# Patient Record
Sex: MALE | Race: WHITE | NOT HISPANIC OR LATINO | URBAN - METROPOLITAN AREA
[De-identification: names, ages, dates, MRNs, and addresses within clinical notes are randomized per-mention and may not be internally consistent; named-entity substitution may affect disease eponyms.]

---

## 2020-10-05 ENCOUNTER — OFFICE VISIT (OUTPATIENT)
Dept: FAMILY MEDICINE CLINIC | Facility: HOSPITAL | Age: 45
End: 2020-10-05
Payer: COMMERCIAL

## 2020-10-05 VITALS
OXYGEN SATURATION: 97 % | BODY MASS INDEX: 44.52 KG/M2 | SYSTOLIC BLOOD PRESSURE: 144 MMHG | WEIGHT: 311 LBS | DIASTOLIC BLOOD PRESSURE: 90 MMHG | TEMPERATURE: 97.2 F | HEIGHT: 70 IN | HEART RATE: 86 BPM

## 2020-10-05 DIAGNOSIS — E66.01 CLASS 3 SEVERE OBESITY DUE TO EXCESS CALORIES WITHOUT SERIOUS COMORBIDITY WITH BODY MASS INDEX (BMI) OF 40.0 TO 44.9 IN ADULT (HCC): ICD-10-CM

## 2020-10-05 DIAGNOSIS — Z13.220 SCREENING CHOLESTEROL LEVEL: ICD-10-CM

## 2020-10-05 DIAGNOSIS — R06.81 WITNESSED APNEIC SPELLS: ICD-10-CM

## 2020-10-05 DIAGNOSIS — R03.0 ELEVATED BLOOD-PRESSURE READING WITHOUT DIAGNOSIS OF HYPERTENSION: ICD-10-CM

## 2020-10-05 DIAGNOSIS — R52 MIGRATORY PAIN: ICD-10-CM

## 2020-10-05 DIAGNOSIS — R53.83 OTHER FATIGUE: ICD-10-CM

## 2020-10-05 DIAGNOSIS — Z00.00 ANNUAL PHYSICAL EXAM: Primary | ICD-10-CM

## 2020-10-05 DIAGNOSIS — Z23 ENCOUNTER FOR IMMUNIZATION: ICD-10-CM

## 2020-10-05 DIAGNOSIS — Z13.1 SCREENING FOR DIABETES MELLITUS: ICD-10-CM

## 2020-10-05 PROCEDURE — 1036F TOBACCO NON-USER: CPT | Performed by: NURSE PRACTITIONER

## 2020-10-05 PROCEDURE — 3725F SCREEN DEPRESSION PERFORMED: CPT | Performed by: NURSE PRACTITIONER

## 2020-10-05 PROCEDURE — 99386 PREV VISIT NEW AGE 40-64: CPT | Performed by: NURSE PRACTITIONER

## 2020-10-05 PROCEDURE — 90471 IMMUNIZATION ADMIN: CPT

## 2020-10-05 PROCEDURE — 90715 TDAP VACCINE 7 YRS/> IM: CPT

## 2020-10-26 DIAGNOSIS — E78.1 HYPERTRIGLYCERIDEMIA: Primary | ICD-10-CM

## 2020-10-26 LAB
1,25(OH)2D3 SERPL-MCNC: 50.2 PG/ML (ref 19.9–79.3)
ALBUMIN SERPL-MCNC: 4.8 G/DL (ref 4–5)
ALBUMIN/GLOB SERPL: 2 {RATIO} (ref 1.2–2.2)
ALP SERPL-CCNC: 51 IU/L (ref 39–117)
ALT SERPL-CCNC: 23 IU/L (ref 0–44)
AST SERPL-CCNC: 16 IU/L (ref 0–40)
B BURGDOR IGG+IGM SER-ACNC: <0.91 ISR (ref 0–0.9)
B BURGDOR IGM SER IA-ACNC: <0.8 INDEX (ref 0–0.79)
BASOPHILS # BLD AUTO: 0 X10E3/UL (ref 0–0.2)
BASOPHILS NFR BLD AUTO: 0 %
BILIRUB SERPL-MCNC: 0.7 MG/DL (ref 0–1.2)
BUN SERPL-MCNC: 14 MG/DL (ref 6–24)
BUN/CREAT SERPL: 14 (ref 9–20)
CALCIUM SERPL-MCNC: 9.7 MG/DL (ref 8.7–10.2)
CCP IGA+IGG SERPL IA-ACNC: 3 UNITS (ref 0–19)
CENTROMERE B AB SER-ACNC: <0.2 AI (ref 0–0.9)
CHLORIDE SERPL-SCNC: 101 MMOL/L (ref 96–106)
CHOLEST SERPL-MCNC: 206 MG/DL (ref 100–199)
CHOLEST/HDLC SERPL: 5.2 RATIO (ref 0–5)
CHROMATIN AB SERPL-ACNC: <0.2 AI (ref 0–0.9)
CO2 SERPL-SCNC: 27 MMOL/L (ref 20–29)
CREAT SERPL-MCNC: 1.01 MG/DL (ref 0.76–1.27)
CRP SERPL-MCNC: 4 MG/L (ref 0–10)
DSDNA AB SER-ACNC: 9 IU/ML (ref 0–9)
ENA JO1 AB SER-ACNC: <0.2 AI (ref 0–0.9)
ENA RNP AB SER-ACNC: 0.2 AI (ref 0–0.9)
ENA SCL70 AB SER-ACNC: 0.2 AI (ref 0–0.9)
ENA SM AB SER-ACNC: <0.2 AI (ref 0–0.9)
ENA SS-A AB SER-ACNC: <0.2 AI (ref 0–0.9)
ENA SS-B AB SER-ACNC: <0.2 AI (ref 0–0.9)
EOSINOPHIL # BLD AUTO: 0.1 X10E3/UL (ref 0–0.4)
EOSINOPHIL NFR BLD AUTO: 2 %
ERYTHROCYTE [DISTWIDTH] IN BLOOD BY AUTOMATED COUNT: 12.8 % (ref 11.6–15.4)
ERYTHROCYTE [SEDIMENTATION RATE] IN BLOOD BY WESTERGREN METHOD: 3 MM/HR (ref 0–15)
EST. AVERAGE GLUCOSE BLD GHB EST-MCNC: 103 MG/DL
GLOBULIN SER-MCNC: 2.4 G/DL (ref 1.5–4.5)
GLUCOSE SERPL-MCNC: 108 MG/DL (ref 65–99)
HBA1C MFR BLD: 5.2 % (ref 4.8–5.6)
HCT VFR BLD AUTO: 46 % (ref 37.5–51)
HDLC SERPL-MCNC: 40 MG/DL
HGB BLD-MCNC: 15.4 G/DL (ref 13–17.7)
IMM GRANULOCYTES # BLD: 0 X10E3/UL (ref 0–0.1)
IMM GRANULOCYTES NFR BLD: 0 %
LDLC SERPL CALC-MCNC: 129 MG/DL (ref 0–99)
LYMPHOCYTES # BLD AUTO: 2 X10E3/UL (ref 0.7–3.1)
LYMPHOCYTES NFR BLD AUTO: 26 %
MCH RBC QN AUTO: 28.2 PG (ref 26.6–33)
MCHC RBC AUTO-ENTMCNC: 33.5 G/DL (ref 31.5–35.7)
MCV RBC AUTO: 84 FL (ref 79–97)
MONOCYTES # BLD AUTO: 0.9 X10E3/UL (ref 0.1–0.9)
MONOCYTES NFR BLD AUTO: 11 %
NEUTROPHILS # BLD AUTO: 4.7 X10E3/UL (ref 1.4–7)
NEUTROPHILS NFR BLD AUTO: 61 %
PLATELET # BLD AUTO: 269 X10E3/UL (ref 150–450)
POTASSIUM SERPL-SCNC: 5.3 MMOL/L (ref 3.5–5.2)
PROT SERPL-MCNC: 7.2 G/DL (ref 6–8.5)
RBC # BLD AUTO: 5.47 X10E6/UL (ref 4.14–5.8)
RHEUMATOID FACT SERPL-ACNC: 10.2 IU/ML (ref 0–13.9)
SL AMB EGFR AFRICAN AMERICAN: 103 ML/MIN/1.73
SL AMB EGFR NON AFRICAN AMERICAN: 89 ML/MIN/1.73
SL AMB SEE BELOW:: NORMAL
SL AMB VLDL CHOLESTEROL CALC: 37 MG/DL (ref 5–40)
SODIUM SERPL-SCNC: 141 MMOL/L (ref 134–144)
TRIGL SERPL-MCNC: 208 MG/DL (ref 0–149)
TSH SERPL DL<=0.005 MIU/L-ACNC: 3.53 UIU/ML (ref 0.45–4.5)
WBC # BLD AUTO: 7.8 X10E3/UL (ref 3.4–10.8)

## 2020-11-04 ENCOUNTER — OFFICE VISIT (OUTPATIENT)
Dept: FAMILY MEDICINE CLINIC | Facility: HOSPITAL | Age: 45
End: 2020-11-04
Payer: COMMERCIAL

## 2020-11-04 VITALS
HEIGHT: 70 IN | TEMPERATURE: 97.3 F | SYSTOLIC BLOOD PRESSURE: 140 MMHG | HEART RATE: 80 BPM | DIASTOLIC BLOOD PRESSURE: 88 MMHG | OXYGEN SATURATION: 98 % | BODY MASS INDEX: 44.64 KG/M2 | WEIGHT: 311.8 LBS

## 2020-11-04 DIAGNOSIS — E78.2 MIXED HYPERLIPIDEMIA: ICD-10-CM

## 2020-11-04 DIAGNOSIS — I10 ESSENTIAL HYPERTENSION: Primary | ICD-10-CM

## 2020-11-04 DIAGNOSIS — R73.01 IMPAIRED FASTING GLUCOSE: ICD-10-CM

## 2020-11-04 PROCEDURE — 99214 OFFICE O/P EST MOD 30 MIN: CPT | Performed by: NURSE PRACTITIONER

## 2020-11-04 PROCEDURE — 3079F DIAST BP 80-89 MM HG: CPT | Performed by: NURSE PRACTITIONER

## 2020-11-04 PROCEDURE — 3077F SYST BP >= 140 MM HG: CPT | Performed by: NURSE PRACTITIONER

## 2020-11-04 RX ORDER — LISINOPRIL 10 MG/1
10 TABLET ORAL DAILY
Qty: 30 TABLET | Refills: 1 | Status: SHIPPED | OUTPATIENT
Start: 2020-11-04 | End: 2020-12-02 | Stop reason: SDUPTHER

## 2020-11-10 ENCOUNTER — OFFICE VISIT (OUTPATIENT)
Dept: SLEEP CENTER | Facility: HOSPITAL | Age: 45
End: 2020-11-10
Payer: COMMERCIAL

## 2020-11-10 VITALS
SYSTOLIC BLOOD PRESSURE: 128 MMHG | TEMPERATURE: 98.2 F | HEIGHT: 70 IN | BODY MASS INDEX: 44.67 KG/M2 | WEIGHT: 312 LBS | DIASTOLIC BLOOD PRESSURE: 84 MMHG | HEART RATE: 94 BPM

## 2020-11-10 DIAGNOSIS — R06.81 WITNESSED APNEIC SPELLS: ICD-10-CM

## 2020-11-10 DIAGNOSIS — R40.0 DAYTIME SLEEPINESS: ICD-10-CM

## 2020-11-10 DIAGNOSIS — G47.33 OSA (OBSTRUCTIVE SLEEP APNEA): Primary | ICD-10-CM

## 2020-11-10 DIAGNOSIS — I10 ESSENTIAL HYPERTENSION: ICD-10-CM

## 2020-11-10 DIAGNOSIS — E66.01 MORBID OBESITY WITH BMI OF 40.0-44.9, ADULT (HCC): ICD-10-CM

## 2020-11-10 PROCEDURE — 1036F TOBACCO NON-USER: CPT | Performed by: INTERNAL MEDICINE

## 2020-11-10 PROCEDURE — 99204 OFFICE O/P NEW MOD 45 MIN: CPT | Performed by: INTERNAL MEDICINE

## 2020-11-10 PROCEDURE — 3008F BODY MASS INDEX DOCD: CPT | Performed by: INTERNAL MEDICINE

## 2020-12-02 ENCOUNTER — OFFICE VISIT (OUTPATIENT)
Dept: FAMILY MEDICINE CLINIC | Facility: HOSPITAL | Age: 45
End: 2020-12-02
Payer: COMMERCIAL

## 2020-12-02 VITALS
DIASTOLIC BLOOD PRESSURE: 84 MMHG | WEIGHT: 310 LBS | HEART RATE: 80 BPM | HEIGHT: 70 IN | BODY MASS INDEX: 44.38 KG/M2 | SYSTOLIC BLOOD PRESSURE: 144 MMHG | TEMPERATURE: 97.4 F

## 2020-12-02 DIAGNOSIS — E87.5 HYPERKALEMIA: ICD-10-CM

## 2020-12-02 DIAGNOSIS — I10 ESSENTIAL HYPERTENSION: Primary | ICD-10-CM

## 2020-12-02 PROCEDURE — 99214 OFFICE O/P EST MOD 30 MIN: CPT | Performed by: NURSE PRACTITIONER

## 2020-12-02 PROCEDURE — 1036F TOBACCO NON-USER: CPT | Performed by: NURSE PRACTITIONER

## 2020-12-02 PROCEDURE — 3077F SYST BP >= 140 MM HG: CPT | Performed by: NURSE PRACTITIONER

## 2020-12-02 PROCEDURE — 3008F BODY MASS INDEX DOCD: CPT | Performed by: NURSE PRACTITIONER

## 2020-12-02 PROCEDURE — 3079F DIAST BP 80-89 MM HG: CPT | Performed by: NURSE PRACTITIONER

## 2020-12-02 RX ORDER — LISINOPRIL 20 MG/1
20 TABLET ORAL DAILY
Qty: 30 TABLET | Refills: 1 | Status: SHIPPED | OUTPATIENT
Start: 2020-12-02 | End: 2021-01-26

## 2021-01-19 ENCOUNTER — HOSPITAL ENCOUNTER (OUTPATIENT)
Dept: SLEEP CENTER | Facility: CLINIC | Age: 46
Discharge: HOME | End: 2021-01-19
Payer: COMMERCIAL

## 2021-01-19 DIAGNOSIS — G47.33 OSA (OBSTRUCTIVE SLEEP APNEA): ICD-10-CM

## 2021-01-19 PROCEDURE — G0399 HOME SLEEP TEST/TYPE 3 PORTA: HCPCS

## 2021-01-20 NOTE — PROGRESS NOTES
Home Sleep Study Documentation    Pre-Sleep Home Study:    Set-up and instructions performed by:  OCTAVIO Collazo    Technician performed demonstration for Patient: yes    Return demonstration performed by Patient: yes    Written instructions provided to Patient: yes    Patient signed consent form: yes        Post-Sleep Home Study:    Additional comments by Patient: none    Home Sleep Study Failed:no:    Failure reason: N/A    Reported or Detected: N/A    Scored by: ANIL Forrest RPSGT

## 2021-01-21 ENCOUNTER — TELEPHONE (OUTPATIENT)
Dept: SLEEP CENTER | Facility: CLINIC | Age: 46
End: 2021-01-21

## 2021-01-21 DIAGNOSIS — G47.33 OSA (OBSTRUCTIVE SLEEP APNEA): Primary | ICD-10-CM

## 2021-01-21 DIAGNOSIS — Z01.812 ENCOUNTER FOR PREPROCEDURE SCREENING LABORATORY TESTING FOR COVID-19: Primary | ICD-10-CM

## 2021-01-21 DIAGNOSIS — Z20.822 ENCOUNTER FOR PREPROCEDURE SCREENING LABORATORY TESTING FOR COVID-19: Primary | ICD-10-CM

## 2021-01-21 NOTE — TELEPHONE ENCOUNTER
Spoke to patient and advised of sleep study results  Severe ANTWAN and hypoxia  CPAP study ordered  [facility]  Instructions given  Verbalized understanding  Discussed COVID protocol:  Patient agreeable to have COVID test on 2/7/2021, for PAP study on 2/17/2021  We ask that you limit interpersonal interactions as much as possible and observe all social distancing and masking precautions from the time of your testing to the time of your study  COVID order placed  COVID letter sent

## 2021-01-22 ENCOUNTER — TELEPHONE (OUTPATIENT)
Dept: SLEEP CENTER | Facility: CLINIC | Age: 46
End: 2021-01-22

## 2021-01-25 ENCOUNTER — OFFICE VISIT (OUTPATIENT)
Dept: FAMILY MEDICINE CLINIC | Facility: HOSPITAL | Age: 46
End: 2021-01-25
Payer: COMMERCIAL

## 2021-01-25 VITALS
WEIGHT: 301 LBS | OXYGEN SATURATION: 97 % | SYSTOLIC BLOOD PRESSURE: 136 MMHG | TEMPERATURE: 97.3 F | BODY MASS INDEX: 43.09 KG/M2 | HEART RATE: 70 BPM | DIASTOLIC BLOOD PRESSURE: 86 MMHG | HEIGHT: 70 IN

## 2021-01-25 DIAGNOSIS — R73.01 IFG (IMPAIRED FASTING GLUCOSE): ICD-10-CM

## 2021-01-25 DIAGNOSIS — G47.33 OSA (OBSTRUCTIVE SLEEP APNEA): ICD-10-CM

## 2021-01-25 DIAGNOSIS — I10 ESSENTIAL HYPERTENSION: Primary | ICD-10-CM

## 2021-01-25 DIAGNOSIS — E87.5 HYPERKALEMIA: ICD-10-CM

## 2021-01-25 PROCEDURE — 99214 OFFICE O/P EST MOD 30 MIN: CPT | Performed by: NURSE PRACTITIONER

## 2021-01-25 PROCEDURE — 3008F BODY MASS INDEX DOCD: CPT | Performed by: NURSE PRACTITIONER

## 2021-01-25 PROCEDURE — 3079F DIAST BP 80-89 MM HG: CPT | Performed by: NURSE PRACTITIONER

## 2021-01-25 PROCEDURE — 3075F SYST BP GE 130 - 139MM HG: CPT | Performed by: NURSE PRACTITIONER

## 2021-01-25 PROCEDURE — 1036F TOBACCO NON-USER: CPT | Performed by: NURSE PRACTITIONER

## 2021-01-25 NOTE — PROGRESS NOTES
Assessment/Plan:    Essential hypertension  Bp improved  Continue on current lisinopril dose  F/U in 4 months  ANTWAN (obstructive sleep apnea)  Will be having inpatient study with titration of setting in February  Hyperkalemia  Will have potassium drawn in office today  Diagnoses and all orders for this visit:    Essential hypertension  -     Comprehensive metabolic panel; Future  -     Comprehensive metabolic panel    IFG (impaired fasting glucose)  -     Comprehensive metabolic panel; Future  -     Hemoglobin A1C; Future  -     Lipid panel; Future  -     Comprehensive metabolic panel  -     Hemoglobin A1C  -     Lipid panel    Hyperkalemia  -     Potassium    ANTWAN (obstructive sleep apnea)          Subjective:      Patient ID: Duy Katz is a 54 y o  male  No issues with lisinopril  Not checking BP  Takes daily  Had home sleep study which was very significant for sleep apnea  Has sleep study in patient in February  Did not have potassium rechecked  Hypertension  This is a chronic problem  The current episode started more than 1 month ago  The problem is controlled  Pertinent negatives include no blurred vision, chest pain, headaches, palpitations, peripheral edema or shortness of breath  Risk factors for coronary artery disease include dyslipidemia, male gender and obesity  Past treatments include ACE inhibitors  The current treatment provides mild improvement  The following portions of the patient's history were reviewed and updated as appropriate: allergies, current medications, past family history, past medical history, past social history, past surgical history and problem list     Review of Systems   Constitutional: Negative for unexpected weight change  Eyes: Negative for blurred vision and visual disturbance  Respiratory: Negative for shortness of breath  Cardiovascular: Negative for chest pain, palpitations and leg swelling     Musculoskeletal: Negative for myalgias  Neurological: Negative for dizziness, weakness, light-headedness, numbness and headaches  Objective:  Vitals:    01/25/21 1614   BP: 136/86   Pulse: 70   Temp: (!) 97 3 °F (36 3 °C)   SpO2: 97%      Physical Exam  Vitals signs reviewed  Constitutional:       Appearance: Normal appearance  He is well-developed  He is obese  Cardiovascular:      Rate and Rhythm: Normal rate and regular rhythm  Pulses:           Carotid pulses are 2+ on the right side and 2+ on the left side  Heart sounds: Normal heart sounds  No murmur  Pulmonary:      Effort: Pulmonary effort is normal       Breath sounds: Normal breath sounds  Skin:     General: Skin is warm and dry  Neurological:      Mental Status: He is alert and oriented to person, place, and time  Psychiatric:         Mood and Affect: Mood normal          Behavior: Behavior normal          Thought Content:  Thought content normal          Judgment: Judgment normal

## 2021-01-26 DIAGNOSIS — I10 ESSENTIAL HYPERTENSION: ICD-10-CM

## 2021-01-26 RX ORDER — LISINOPRIL 20 MG/1
TABLET ORAL
Qty: 30 TABLET | Refills: 3 | Status: SHIPPED | OUTPATIENT
Start: 2021-01-26 | End: 2021-04-19 | Stop reason: SDUPTHER

## 2021-01-27 LAB — POTASSIUM SERPL-SCNC: 4.6 MMOL/L (ref 3.5–5.2)

## 2021-02-09 NOTE — TELEPHONE ENCOUNTER
Patient informed that COVID testing is to be performed 10 days prior to PAP study  Patient is to tell site that it is needed for a procedure so it is expedited  Testing site information provided 
Reminded patient to have COVID test  He is going Friday to [facility] 
2 = assistive person

## 2021-02-12 DIAGNOSIS — Z01.812 ENCOUNTER FOR PREPROCEDURE SCREENING LABORATORY TESTING FOR COVID-19: ICD-10-CM

## 2021-02-12 DIAGNOSIS — Z20.822 ENCOUNTER FOR PREPROCEDURE SCREENING LABORATORY TESTING FOR COVID-19: ICD-10-CM

## 2021-02-12 LAB — SARS-COV-2 RNA RESP QL NAA+PROBE: NEGATIVE

## 2021-02-12 PROCEDURE — U0003 INFECTIOUS AGENT DETECTION BY NUCLEIC ACID (DNA OR RNA); SEVERE ACUTE RESPIRATORY SYNDROME CORONAVIRUS 2 (SARS-COV-2) (CORONAVIRUS DISEASE [COVID-19]), AMPLIFIED PROBE TECHNIQUE, MAKING USE OF HIGH THROUGHPUT TECHNOLOGIES AS DESCRIBED BY CMS-2020-01-R: HCPCS | Performed by: INTERNAL MEDICINE

## 2021-02-12 PROCEDURE — U0005 INFEC AGEN DETEC AMPLI PROBE: HCPCS | Performed by: INTERNAL MEDICINE

## 2021-02-17 ENCOUNTER — HOSPITAL ENCOUNTER (OUTPATIENT)
Dept: SLEEP CENTER | Facility: CLINIC | Age: 46
Discharge: HOME | End: 2021-02-17
Payer: COMMERCIAL

## 2021-02-17 DIAGNOSIS — G47.33 OSA (OBSTRUCTIVE SLEEP APNEA): ICD-10-CM

## 2021-02-17 PROCEDURE — 95811 POLYSOM 6/>YRS CPAP 4/> PARM: CPT

## 2021-02-18 DIAGNOSIS — G47.33 OSA (OBSTRUCTIVE SLEEP APNEA): Primary | ICD-10-CM

## 2021-02-18 NOTE — PROGRESS NOTES
Sleep Study Documentation    Pre-Sleep Study       Sleep testing procedure explained to patient:YES    Patient napped prior to study:NO    Caffeine:Dayshift worker after 12PM   Caffeine use:YES- tea  18 ounces or more    Alcohol:Dayshift workers after 5PM: Alcohol use:NO    Typical day for patient:YES       Study Documentation    Sleep Study Indications: The patient is here for a CPAP titration  He had a home study done with results of an AHI of 87 8 consisting of obstructive, central and mixed apneas, hypopneas and snoring  Sleep Study: Treatment   Optimal PAP pressure: 2cm/17cm  Leak:Small  Snore:Eliminated  REM Obtained:yes  Supplemental O2: no    Minimum SaO2 80  Baseline SaO2 94  PAP mask tried (list all)[employer] Simplus full face mask size large  PAP mask choice (final) [employer] Simplus full face mask size large  PAP mask type:full face  PAP pressure at which snoring was eliminated 21/17    Mode of Therapy:BiPAP  ETCO2:No  CPAP changed to BiPAP:Yes  If yes why patient was still snoring and having events at a pressure of 15cm on CPAP    Mode of Therapy:BiPAP    EKG abnormalities: no     EEG abnormalities: no    Sleep Study Recorded < 2 hours: N/A    Sleep Study Recorded > 2 hours but incomplete study: N/A    Sleep Study Recorded 6 hours but no sleep obtained: NO    Patient classification: employed       Post-Sleep Study    Medication used at bedtime or during sleep study:NO    Patient reports time it took to fall asleep:20 to 30 minutes    Patient reports waking up during study:1 to 2 times  Patient reports returning to sleep without difficulty  Patient reports sleeping 6 to 8 hours with dreaming  Patient reports sleep during study:typical    Patient rated sleepiness: Not sleepy or tired    PAP treatment:yes: Post PAP treatment patient reports feeling unsure if a change is noted and  would wear PAP mask at home

## 2021-02-19 ENCOUNTER — TELEPHONE (OUTPATIENT)
Dept: SLEEP CENTER | Facility: CLINIC | Age: 46
End: 2021-02-19

## 2021-02-19 NOTE — TELEPHONE ENCOUNTER
Left message for the patient to call back CPAP study is resulted    BIPAP order in Epic  Patient needs to schedule DME set up and compliance appointment

## 2021-02-19 NOTE — TELEPHONE ENCOUNTER
Spoke with patient, advised above  Patient will check with his insurance and call back with DME company  Will also need to schedule compliance follow up at that time

## 2021-02-23 NOTE — TELEPHONE ENCOUNTER
Patient called, states he would like to use [employer]  All information faxed to [facility]  Compliance follow up scheduled 5/4/2021 in [place]

## 2021-04-19 DIAGNOSIS — I10 ESSENTIAL HYPERTENSION: ICD-10-CM

## 2021-04-19 RX ORDER — LISINOPRIL 20 MG/1
20 TABLET ORAL DAILY
Qty: 90 TABLET | Refills: 0 | Status: SHIPPED | OUTPATIENT
Start: 2021-04-19 | End: 2021-07-20 | Stop reason: SDUPTHER

## 2021-05-04 ENCOUNTER — OFFICE VISIT (OUTPATIENT)
Dept: SLEEP CENTER | Facility: HOSPITAL | Age: 46
End: 2021-05-04
Payer: COMMERCIAL

## 2021-05-04 VITALS
SYSTOLIC BLOOD PRESSURE: 130 MMHG | BODY MASS INDEX: 42.28 KG/M2 | HEIGHT: 71 IN | WEIGHT: 302 LBS | DIASTOLIC BLOOD PRESSURE: 68 MMHG | HEART RATE: 78 BPM

## 2021-05-04 DIAGNOSIS — I10 ESSENTIAL HYPERTENSION: ICD-10-CM

## 2021-05-04 DIAGNOSIS — E66.01 MORBID OBESITY WITH BMI OF 40.0-44.9, ADULT (HCC): ICD-10-CM

## 2021-05-04 DIAGNOSIS — G47.33 OSA (OBSTRUCTIVE SLEEP APNEA): Primary | ICD-10-CM

## 2021-05-04 DIAGNOSIS — R68.2 DRY MOUTH: ICD-10-CM

## 2021-05-04 PROCEDURE — 99214 OFFICE O/P EST MOD 30 MIN: CPT | Performed by: INTERNAL MEDICINE

## 2021-05-04 NOTE — PROGRESS NOTES
Review of Systems      Genitourinary none   Cardiology none   Gastrointestinal none   Neurology need to move extremities   Constitutional none   Integumentary none   Psychiatry none   Musculoskeletal legs twitching/jerking and leg cramps   Pulmonary none   ENT none   Endocrine none   Hematological none

## 2021-05-04 NOTE — PROGRESS NOTES
Follow-Up Note - Abel 45  54 y o  male  BDM:[birth year]  IIK:[id number]    CC: I saw this patient for follow-up in clinic today for Sleep disordered breathing, Coexisting Sleep and Medical Problems  He had diagnostic and therapeutic studies  He is here to review results and adjust therapy  A home sleep study in November of 2020 demonstrated respiratory event index of 87 8 hour  Minimal oxygen saturation was 63% and 71 2% of the study was spent with saturations below 90%  A small index was 60 9%  During a subsequent therapeutic study, sleep disordered breathing was successfully remediated with BiPAP at 21/17 cm H2O  PFSH, Problem List, Medications & Allergies were reviewed in EMR  Interval changes: none reported  He  has a past medical history of Hypertension  He has a current medication list which includes the following prescription(s): lisinopril  PHYSIOLOGICAL DATA REVIEW AND INTERPRETATION:   No data was available, but he reports regular use of the device for > 4 hours/night  He reports respiratory event index is typically less than 1 per hour  Compliance: Very good; Sleep disordered breathing:stable & within target range    SUBJECTIVE: Regarding use of PAP Nicolas reports:   · He is experiencing some adverse effects: dry mouth/throat  · He is benefiting from use: sleeping better   Sleep Routine: He reports getting 6-7 hrs sleep  ; he no difficulty initiating or maintaining sleep   He awakens spontaneously and feels more refreshed since on Rx  Zully Stai reports significantly improved  Excessive Daytime Sleepiness dozes off when sedentary at home and rated himself at Total score: 10 /24 on the Napoleon Sleepiness Scale  Habits: reports that he has never smoked  He has never used smokeless tobacco ,  reports current alcohol use ,  reports no history of drug use , Caffeine use: limited , Exercise routine: regular    ROS: as attached   Significant for intentional weight reduction of around 10 lb since his last visit  He reported no nasal, respiratory or cardiac symptoms  He has noticed an improvement in his blood pressure  Toyah Organ EXAM: /68   Pulse 78   Ht 5' 10 5" (1 791 m)   Wt (!) 137 kg (302 lb)   BMI 42 72 kg/m²     Patient is well groomed; well appearing  H&N: EOMI; NC/AT:no facial pressure marks, no rashes  Skin/Extrem: col & hydration normal; no edema  Psych: cooperativeand in no distress  Mental state:appears normal   Resp: Respiratory effort is normal  CNS: Alert, orientated, clear & coherent speech  Physical findings otherwise essentially unchanged from previous  IMPRESSION: Problem List Items & Comorbidities Addressed this Visit    1  ANTWAN (obstructive sleep apnea)  PAP DME Resupply/Reorder   2  Dry mouth     3  Essential hypertension     4  Morbid obesity with BMI of 40 0-44 9, adult ([facility] [address] )         PLAN:  1  I reviewed results of prior studies and with the patient  2  I discussed treatment options with risks and benefits  3  Treatment with  PAP is medically necessary and Fifi Cox is agreable to continue use  4  Care of equipment, methods to improve comfort using PAP and importance of compliance with therapy were discussed  5  Pressure setting: continue 21/17 cmH2O     6  Rx provided to replace  supplies and Care coordinated with DME provider  7  Discussed strategies for weight reduction  8  Follow-up is advised in 1 year or sooner if needed to monitor progress, compliance and to adjust therapy  Thank you for allowing me to participate in the care of this patient  Sincerely,    Authenticated electronically by Roosevelt Mcadams MD on 02/37/19   Board Certified Specialist     Addendum: We were able to obtain data download that shows compliance is very good  with use for more than 4 hours 87% of the time  The respiratory event index is 3 9 per hour at 21/17 cm H2O    There frequent mass leaks however, pressure setting is adequate

## 2021-05-04 NOTE — PATIENT INSTRUCTIONS

## 2021-05-05 ENCOUNTER — TELEPHONE (OUTPATIENT)
Dept: SLEEP CENTER | Facility: CLINIC | Age: 46
End: 2021-05-05

## 2021-05-14 LAB
ALBUMIN SERPL-MCNC: 4.5 G/DL (ref 4–5)
ALBUMIN/GLOB SERPL: 1.9 {RATIO} (ref 1.2–2.2)
ALP SERPL-CCNC: 39 IU/L (ref 39–117)
ALT SERPL-CCNC: 19 IU/L (ref 0–44)
AST SERPL-CCNC: 16 IU/L (ref 0–40)
BILIRUB SERPL-MCNC: 0.6 MG/DL (ref 0–1.2)
BUN SERPL-MCNC: 16 MG/DL (ref 6–24)
BUN/CREAT SERPL: 16 (ref 9–20)
CALCIUM SERPL-MCNC: 9.6 MG/DL (ref 8.7–10.2)
CHLORIDE SERPL-SCNC: 103 MMOL/L (ref 96–106)
CHOLEST SERPL-MCNC: 182 MG/DL (ref 100–199)
CHOLEST/HDLC SERPL: 5.2 RATIO (ref 0–5)
CO2 SERPL-SCNC: 26 MMOL/L (ref 20–29)
CREAT SERPL-MCNC: 1.02 MG/DL (ref 0.76–1.27)
EST. AVERAGE GLUCOSE BLD GHB EST-MCNC: 103 MG/DL
GLOBULIN SER-MCNC: 2.4 G/DL (ref 1.5–4.5)
GLUCOSE SERPL-MCNC: 94 MG/DL (ref 65–99)
HBA1C MFR BLD: 5.2 % (ref 4.8–5.6)
HDLC SERPL-MCNC: 35 MG/DL
LDLC SERPL CALC-MCNC: 120 MG/DL (ref 0–99)
POTASSIUM SERPL-SCNC: 4.8 MMOL/L (ref 3.5–5.2)
PROT SERPL-MCNC: 6.9 G/DL (ref 6–8.5)
SL AMB EGFR AFRICAN AMERICAN: 101 ML/MIN/1.73
SL AMB EGFR NON AFRICAN AMERICAN: 88 ML/MIN/1.73
SL AMB VLDL CHOLESTEROL CALC: 27 MG/DL (ref 5–40)
SODIUM SERPL-SCNC: 140 MMOL/L (ref 134–144)
TRIGL SERPL-MCNC: 150 MG/DL (ref 0–149)

## 2021-05-18 ENCOUNTER — OFFICE VISIT (OUTPATIENT)
Dept: FAMILY MEDICINE CLINIC | Facility: HOSPITAL | Age: 46
End: 2021-05-18
Payer: COMMERCIAL

## 2021-05-18 VITALS
TEMPERATURE: 99.4 F | WEIGHT: 298.2 LBS | SYSTOLIC BLOOD PRESSURE: 150 MMHG | HEART RATE: 80 BPM | BODY MASS INDEX: 41.75 KG/M2 | HEIGHT: 71 IN | DIASTOLIC BLOOD PRESSURE: 86 MMHG

## 2021-05-18 DIAGNOSIS — E78.2 MIXED HYPERLIPIDEMIA: ICD-10-CM

## 2021-05-18 DIAGNOSIS — R73.01 IMPAIRED FASTING GLUCOSE: ICD-10-CM

## 2021-05-18 DIAGNOSIS — I10 ESSENTIAL HYPERTENSION: Primary | ICD-10-CM

## 2021-05-18 DIAGNOSIS — G47.33 OSA (OBSTRUCTIVE SLEEP APNEA): ICD-10-CM

## 2021-05-18 PROBLEM — E87.5 HYPERKALEMIA: Status: RESOLVED | Noted: 2020-12-02 | Resolved: 2021-05-18

## 2021-05-18 PROCEDURE — 3077F SYST BP >= 140 MM HG: CPT | Performed by: NURSE PRACTITIONER

## 2021-05-18 PROCEDURE — 3079F DIAST BP 80-89 MM HG: CPT | Performed by: NURSE PRACTITIONER

## 2021-05-18 PROCEDURE — 99214 OFFICE O/P EST MOD 30 MIN: CPT | Performed by: NURSE PRACTITIONER

## 2021-05-18 PROCEDURE — 1036F TOBACCO NON-USER: CPT | Performed by: NURSE PRACTITIONER

## 2021-05-18 PROCEDURE — 3725F SCREEN DEPRESSION PERFORMED: CPT | Performed by: NURSE PRACTITIONER

## 2021-05-18 PROCEDURE — 3008F BODY MASS INDEX DOCD: CPT | Performed by: NURSE PRACTITIONER

## 2021-05-18 NOTE — ASSESSMENT & PLAN NOTE
BP is elevated today  Was well controlled 2 weeks ago at sleep medicine appointment  Reports increased stress with girlfriend  Will hold off on any dose change, however I do recommend he purchase home BP cuff and start checking daily  F/U in 4 weeks for BP check

## 2021-05-18 NOTE — ASSESSMENT & PLAN NOTE
LDL is slightly improved  TG moderately improved  HDL is lower  Discussed continued dietary changes, exercise and weight loss  Recheck in 6 months  Give lab slip at next OV

## 2021-05-18 NOTE — PROGRESS NOTES
Assessment/Plan:    Essential hypertension  BP is elevated today  Was well controlled 2 weeks ago at sleep medicine appointment  Reports increased stress with girlfriend  Will hold off on any dose change, however I do recommend he purchase home BP cuff and start checking daily  F/U in 4 weeks for BP check  Mixed hyperlipidemia  LDL is slightly improved  TG moderately improved  HDL is lower  Discussed continued dietary changes, exercise and weight loss  Recheck in 6 months  Give lab slip at next OV  Impaired fasting glucose  FG is improved with normal A1C      ANTWAN (obstructive sleep apnea)  Managed by sleep medicine  On BiPAP and doing very well  Diagnoses and all orders for this visit:    Essential hypertension    ANTWAN (obstructive sleep apnea)    Mixed hyperlipidemia    Impaired fasting glucose          Subjective:      Patient ID: Rinku Lynch is a 54 y o  male  Doing well  Making diet changes  Has cut out caffeine  Increased stress with girlfriend's anxiety  Has been using BiPAP and feel better than ever  Hypertension  The current episode started more than 1 month ago  The problem is controlled  Pertinent negatives include no blurred vision, chest pain, headaches, palpitations, peripheral edema or shortness of breath  Risk factors for coronary artery disease include male gender, obesity and dyslipidemia  Past treatments include ACE inhibitors  There are no compliance problems  The following portions of the patient's history were reviewed and updated as appropriate: allergies, current medications, past family history, past medical history, past social history, past surgical history and problem list     Review of Systems   Constitutional: Negative for unexpected weight change  Eyes: Negative for blurred vision and visual disturbance  Respiratory: Negative for shortness of breath  Cardiovascular: Negative for chest pain, palpitations and leg swelling  Neurological: Negative for dizziness, weakness, light-headedness, numbness and headaches  Objective:  Vitals:    05/18/21 1453   BP: 150/86   Pulse: 80   Temp: 99 4 °F (37 4 °C)      Physical Exam  Constitutional:       Appearance: Normal appearance  He is well-developed  He is obese  Cardiovascular:      Rate and Rhythm: Normal rate and regular rhythm  Pulses:           Carotid pulses are 2+ on the right side and 2+ on the left side  Heart sounds: Normal heart sounds  No murmur  Pulmonary:      Effort: Pulmonary effort is normal       Breath sounds: Normal breath sounds  Skin:     General: Skin is warm and dry  Neurological:      Mental Status: He is alert and oriented to person, place, and time  Psychiatric:         Mood and Affect: Mood normal          Behavior: Behavior normal          Thought Content:  Thought content normal          Judgment: Judgment normal

## 2021-06-21 ENCOUNTER — TELEPHONE (OUTPATIENT)
Dept: FAMILY MEDICINE CLINIC | Facility: HOSPITAL | Age: 46
End: 2021-06-21

## 2021-06-21 NOTE — TELEPHONE ENCOUNTER
----- Message from Dipesh Cool sent at 6/21/2021  4:25 PM EDT -----  Regarding: FW: Non-Urgent Medical Question  Contact: [phone]  I responded to the pt  Please call him and cancel his OV with me tomorrow and schedule him for a BP check in 4 weeks  ----- Message -----  From: Magdalena Peng MA  Sent: 6/21/2021   4:11 PM EDT  To: SUKH Cool  Subject: FW: Non-Urgent Medical Question                    ----- Message -----  From: Doug Milner  Sent: 6/21/2021   3:44 PM EDT  To: [facility]  Subject: Non-Urgent Medical Question                      Hi   I have an appointment with Dr Pierce Stone tomorrow at 3:25 to follow up on my blood pressure readings because it was elevated at my last appointment  Since then, I purchase a monitoring machine and it's been consistently on the higher side; ranging from lowest 125/82 to highest 170/110  Mostly it averaged around 145/95  If this sufficient information for her to determine what may need to be adjusted with my medication; saving me a trip to the office and needing to get out of work? I would greatly appreciate it if possible  Please let me know, or call me at [phone] (leave a message, if needed)      Thanks,  Joyce Guevara

## 2021-07-20 ENCOUNTER — OFFICE VISIT (OUTPATIENT)
Dept: FAMILY MEDICINE CLINIC | Facility: HOSPITAL | Age: 46
End: 2021-07-20
Payer: COMMERCIAL

## 2021-07-20 VITALS
SYSTOLIC BLOOD PRESSURE: 154 MMHG | BODY MASS INDEX: 40.71 KG/M2 | HEIGHT: 71 IN | HEART RATE: 80 BPM | TEMPERATURE: 99.6 F | DIASTOLIC BLOOD PRESSURE: 96 MMHG | WEIGHT: 290.8 LBS

## 2021-07-20 DIAGNOSIS — I10 ESSENTIAL HYPERTENSION: Primary | ICD-10-CM

## 2021-07-20 PROCEDURE — 3080F DIAST BP >= 90 MM HG: CPT | Performed by: NURSE PRACTITIONER

## 2021-07-20 PROCEDURE — 99214 OFFICE O/P EST MOD 30 MIN: CPT | Performed by: NURSE PRACTITIONER

## 2021-07-20 PROCEDURE — 3077F SYST BP >= 140 MM HG: CPT | Performed by: NURSE PRACTITIONER

## 2021-07-20 PROCEDURE — 1036F TOBACCO NON-USER: CPT | Performed by: NURSE PRACTITIONER

## 2021-07-20 PROCEDURE — 3008F BODY MASS INDEX DOCD: CPT | Performed by: NURSE PRACTITIONER

## 2021-07-20 RX ORDER — HYDROCHLOROTHIAZIDE 25 MG/1
25 TABLET ORAL DAILY
Qty: 30 TABLET | Refills: 1 | Status: SHIPPED | OUTPATIENT
Start: 2021-07-20 | End: 2021-08-19 | Stop reason: SDUPTHER

## 2021-07-20 RX ORDER — LISINOPRIL 20 MG/1
20 TABLET ORAL DAILY
Qty: 90 TABLET | Refills: 1 | Status: SHIPPED | OUTPATIENT
Start: 2021-07-20 | End: 2021-08-19 | Stop reason: SDUPTHER

## 2021-07-20 NOTE — PROGRESS NOTES
Assessment/Plan:    Essential hypertension  BP remains uncontrolled  Beat to increase HCTZ  Again reviewed lifestyle changes including reducing alcohol  Continue to check BP at home but wait at least an hour after taking meds  F/U in 4 weeks  Diagnoses and all orders for this visit:    Essential hypertension  -     hydrochlorothiazide (HYDRODIURIL) 25 mg tablet; Take 1 tablet (25 mg total) by mouth daily  -     lisinopril (ZESTRIL) 20 mg tablet; Take 1 tablet (20 mg total) by mouth daily          Subjective:      Patient ID: Leora Payne is a 54 y o  male  Checking BP at home and running 150-160s  Has been as high as 180s and as low as 120s  DBP running 80-90s  Checks BP at the same time he takes his meds in the morning  Overall feels good  No chest pain, palpitations, sob  GAYE, HA, dizziness or lightheadedness  Diet has been good  Switched to diet decaf iced tea  Has lost weight  Still drinking alcohol  Not every day but will have a few beers on the weekend  The following portions of the patient's history were reviewed and updated as appropriate: allergies, current medications, past family history, past medical history, past social history, past surgical history and problem list     Review of Systems   Constitutional: Negative for fatigue and unexpected weight change  Eyes: Negative for visual disturbance  Respiratory: Negative for shortness of breath  Cardiovascular: Negative for chest pain, palpitations and leg swelling  Neurological: Negative for dizziness, syncope, light-headedness and headaches  Objective:  Vitals:    07/20/21 1355   BP: 154/96   Pulse: 80   Temp: 99 6 °F (37 6 °C)      Physical Exam  Vitals reviewed  Constitutional:       Appearance: Normal appearance  He is obese  Cardiovascular:      Rate and Rhythm: Normal rate and regular rhythm  Heart sounds: Normal heart sounds  No murmur heard       Pulmonary:      Effort: Pulmonary effort is normal       Breath sounds: Normal breath sounds  Skin:     General: Skin is warm and dry  Neurological:      Mental Status: He is alert and oriented to person, place, and time  Psychiatric:         Mood and Affect: Mood normal          Behavior: Behavior normal          Thought Content:  Thought content normal          Judgment: Judgment normal

## 2021-07-20 NOTE — ASSESSMENT & PLAN NOTE
BP remains uncontrolled  Beat to increase HCTZ  Again reviewed lifestyle changes including reducing alcohol  Continue to check BP at home but wait at least an hour after taking meds  F/U in 4 weeks

## 2021-08-19 ENCOUNTER — OFFICE VISIT (OUTPATIENT)
Dept: FAMILY MEDICINE CLINIC | Facility: HOSPITAL | Age: 46
End: 2021-08-19
Payer: COMMERCIAL

## 2021-08-19 VITALS
SYSTOLIC BLOOD PRESSURE: 138 MMHG | HEIGHT: 71 IN | OXYGEN SATURATION: 97 % | DIASTOLIC BLOOD PRESSURE: 76 MMHG | WEIGHT: 295.4 LBS | HEART RATE: 74 BPM | TEMPERATURE: 98 F | BODY MASS INDEX: 41.35 KG/M2

## 2021-08-19 DIAGNOSIS — Z11.59 ENCOUNTER FOR HEPATITIS C SCREENING TEST FOR LOW RISK PATIENT: ICD-10-CM

## 2021-08-19 DIAGNOSIS — Z11.4 SCREENING FOR HIV (HUMAN IMMUNODEFICIENCY VIRUS): ICD-10-CM

## 2021-08-19 DIAGNOSIS — I10 ESSENTIAL HYPERTENSION: Primary | ICD-10-CM

## 2021-08-19 DIAGNOSIS — E78.2 MIXED HYPERLIPIDEMIA: ICD-10-CM

## 2021-08-19 PROCEDURE — 3078F DIAST BP <80 MM HG: CPT | Performed by: NURSE PRACTITIONER

## 2021-08-19 PROCEDURE — 99214 OFFICE O/P EST MOD 30 MIN: CPT | Performed by: NURSE PRACTITIONER

## 2021-08-19 PROCEDURE — 3008F BODY MASS INDEX DOCD: CPT | Performed by: NURSE PRACTITIONER

## 2021-08-19 PROCEDURE — 1036F TOBACCO NON-USER: CPT | Performed by: NURSE PRACTITIONER

## 2021-08-19 PROCEDURE — 3075F SYST BP GE 130 - 139MM HG: CPT | Performed by: NURSE PRACTITIONER

## 2021-08-19 RX ORDER — LISINOPRIL 20 MG/1
20 TABLET ORAL DAILY
Qty: 90 TABLET | Refills: 1 | Status: SHIPPED | OUTPATIENT
Start: 2021-08-19 | End: 2021-10-19 | Stop reason: SDUPTHER

## 2021-08-19 RX ORDER — HYDROCHLOROTHIAZIDE 25 MG/1
25 TABLET ORAL DAILY
Qty: 90 TABLET | Refills: 1 | Status: SHIPPED | OUTPATIENT
Start: 2021-08-19 | End: 2021-10-19 | Stop reason: SDUPTHER

## 2021-08-19 NOTE — PATIENT INSTRUCTIONS
Obesity   AMBULATORY CARE:   Obesity  is when your body mass index (BMI) is greater than 30  Your healthcare provider will use your height and weight to measure your BMI  The risks of obesity include  many health problems, such as injuries or physical disability  You may need tests to check for the following:  · Diabetes    · High blood pressure or high cholesterol    · Heart disease    · Gallbladder or liver disease    · Cancer of the colon, breast, prostate, liver, or kidney    · Sleep apnea    · Arthritis or gout    Seek care immediately if:   · You have a severe headache, confusion, or difficulty speaking  · You have weakness on one side of your body  · You have chest pain, sweating, or shortness of breath  Contact your healthcare provider if:   · You have symptoms of gallbladder or liver disease, such as pain in your upper abdomen  · You have knee or hip pain and discomfort while walking  · You have symptoms of diabetes, such as intense hunger and thirst, and frequent urination  · You have symptoms of sleep apnea, such as snoring or daytime sleepiness  · You have questions or concerns about your condition or care  Treatment for obesity  focuses on helping you lose weight to improve your health  Even a small decrease in BMI can reduce the risk for many health problems  Your healthcare provider will help you set a weight-loss goal   · Lifestyle changes  are the first step in treating obesity  These include making healthy food choices and getting regular physical activity  Your healthcare provider may suggest a weight-loss program that involves coaching, education, and therapy  · Medicine  may help you lose weight when it is used with a healthy diet and physical activity  · Surgery  can help you lose weight if you are very obese and have other health problems  There are several types of weight-loss surgery  Ask your healthcare provider for more information      Be successful losing weight:   · Set small, realistic goals  An example of a small goal is to walk for 20 minutes 5 days a week  Anther goal is to lose 5% of your body weight  · Tell friends, family members, and coworkers about your goals  and ask for their support  Ask a friend to lose weight with you, or join a weight-loss support group  · Identify foods or triggers that may cause you to overeat , and find ways to avoid them  Remove tempting high-calorie foods from your home and workplace  Place a bowl of fresh fruit on your kitchen counter  If stress causes you to eat, then find other ways to cope with stress  · Keep a diary to track what you eat and drink  Also write down how many minutes of physical activity you do each day  Weigh yourself once a week and record it in your diary  Eating changes: You will need to eat 500 to 1,000 fewer calories each day than you currently eat to lose 1 to 2 pounds a week  The following changes will help you cut calories:  · Eat smaller portions  Use small plates, no larger than 9 inches in diameter  Fill your plate half full of fruits and vegetables  Measure your food using measuring cups until you know what a serving size looks like  · Eat 3 meals and 1 or 2 snacks each day  Plan your meals in advance  [facility] and eat at home most of the time  Eat slowly  Do not skip meals  Skipping meals can lead to overeating later in the day  This can make it harder for you to lose weight  Talk with a dietitian to help you make a meal plan and schedule that is right for you  · Eat fruits and vegetables at every meal   They are low in calories and high in fiber, which makes you feel full  Do not add butter, margarine, or cream sauce to vegetables  Use herbs to season steamed vegetables  · Eat less fat and fewer fried foods  Eat more baked or grilled chicken and fish  These protein sources are lower in calories and fat than red meat  Limit fast food   Dress your salads with olive oil and vinegar instead of bottled dressing  · Limit the amount of sugar you eat  Do not drink sugary beverages  Limit alcohol  Activity changes:  Physical activity is good for your body in many ways  It helps you burn calories and build strong muscles  It decreases stress and depression, and improves your mood  It can also help you sleep better  Talk to your healthcare provider before you begin an exercise program   · Exercise for at least 30 minutes 5 days a week  Start slowly  Set aside time each day for physical activity that you enjoy and that is convenient for you  It is best to do both weight training and an activity that increases your heart rate, such as walking, bicycling, or swimming  · Find ways to be more active  Do yard work and housecleaning  Walk up the stairs instead of using elevators  Spend your leisure time going to events that require walking, such as outdoor festivals or fairs  This extra physical activity can help you lose weight and keep it off  Follow up with your healthcare provider as directed: You may need to meet with a dietitian  Write down your questions so you remember to ask them during your visits  © Copyright [employer] 2021 Information is for End User's use only and may not be sold, redistributed or otherwise used for commercial purposes  All illustrations and images included in CareNotes® are the copyrighted property of [employer]  or Fredy Morrow  The above information is an  only  It is not intended as medical advice for individual conditions or treatments  Talk to your doctor, nurse or pharmacist before following any medical regimen to see if it is safe and effective for you

## 2021-08-19 NOTE — PROGRESS NOTES
Assessment/Plan:    Essential hypertension  BP improved with increase in HCTZ  Continue on current regimen  F/U in 6 months  Mixed hyperlipidemia  TG were high with low HDL  Recheck in 6 months  Diagnoses and all orders for this visit:    Essential hypertension  -     hydrochlorothiazide (HYDRODIURIL) 25 mg tablet; Take 1 tablet (25 mg total) by mouth daily  -     lisinopril (ZESTRIL) 20 mg tablet; Take 1 tablet (20 mg total) by mouth daily    Mixed hyperlipidemia  -     Lipid panel; Future  -     Lipid panel    Screening for HIV (human immunodeficiency virus)  -     Human Immunodeficiency Virus 1/2 Antigen / Antibody ( Fourth Generation) with Reflex Testing; Future  -     Human Immunodeficiency Virus 1/2 Antigen / Antibody ( Fourth Generation) with Reflex Testing    Encounter for hepatitis C screening test for low risk patient  -     Hepatitis C antibody; Future  -     Hepatitis C antibody          Subjective:      Patient ID: Cheyenne Wellington is a 54 y o  male  Checking BP at home and running high 130s to low 140s  Admits he needs to exercise more  Has been watching sodium intake  No caffeine  Hypertension  This is a chronic problem  The current episode started more than 1 month ago  The problem is controlled  Pertinent negatives include no blurred vision, chest pain, headaches, palpitations, peripheral edema or shortness of breath  Risk factors for coronary artery disease include dyslipidemia, obesity and sedentary lifestyle  Past treatments include ACE inhibitors and diuretics  The current treatment provides moderate improvement  Compliance problems include exercise  The following portions of the patient's history were reviewed and updated as appropriate: allergies, current medications, past family history, past medical history, past social history, past surgical history and problem list     Review of Systems   Constitutional: Negative for fatigue     Eyes: Negative for blurred vision and visual disturbance  Respiratory: Negative for shortness of breath  Cardiovascular: Negative for chest pain, palpitations and leg swelling  Neurological: Negative for dizziness, light-headedness and headaches  Objective:  Vitals:    08/19/21 0819   BP: 138/76   Pulse: 74   Temp: 98 °F (36 7 °C)   SpO2: 97%      Physical Exam  Vitals reviewed  Constitutional:       Appearance: Normal appearance  He is well-developed  He is obese  Cardiovascular:      Rate and Rhythm: Normal rate and regular rhythm  Pulses:           Carotid pulses are 2+ on the right side and 2+ on the left side  Heart sounds: Normal heart sounds  No murmur heard  Pulmonary:      Effort: Pulmonary effort is normal       Breath sounds: Normal breath sounds  Skin:     General: Skin is warm and dry  Neurological:      Mental Status: He is alert and oriented to person, place, and time  Psychiatric:         Mood and Affect: Mood normal          Behavior: Behavior normal          Thought Content: Thought content normal          Judgment: Judgment normal          BMI Counseling: Body mass index is 41 79 kg/m²  The BMI is above normal  Nutrition recommendations include reducing portion sizes, decreasing overall calorie intake, 3-5 servings of fruits/vegetables daily, reducing fast food intake, consuming healthier snacks, decreasing soda and/or juice intake, moderation in carbohydrate intake and increasing intake of lean protein  Exercise recommendations include moderate aerobic physical activity for 150 minutes/week

## 2021-10-19 DIAGNOSIS — I10 ESSENTIAL HYPERTENSION: ICD-10-CM

## 2021-10-19 RX ORDER — LISINOPRIL 20 MG/1
20 TABLET ORAL DAILY
Qty: 90 TABLET | Refills: 1 | Status: SHIPPED | OUTPATIENT
Start: 2021-10-19 | End: 2022-03-26

## 2021-10-19 RX ORDER — HYDROCHLOROTHIAZIDE 25 MG/1
25 TABLET ORAL DAILY
Qty: 90 TABLET | Refills: 1 | Status: SHIPPED | OUTPATIENT
Start: 2021-10-19 | End: 2022-03-26

## 2022-02-12 LAB
CHOLEST SERPL-MCNC: 203 MG/DL (ref 100–199)
CHOLEST/HDLC SERPL: 5.1 RATIO (ref 0–5)
HCV AB S/CO SERPL IA: <0.1 S/CO RATIO (ref 0–0.9)
HDLC SERPL-MCNC: 40 MG/DL
HIV 1+2 AB+HIV1 P24 AG SERPL QL IA: NON REACTIVE
LDLC SERPL CALC-MCNC: 127 MG/DL (ref 0–99)
SL AMB VLDL CHOLESTEROL CALC: 36 MG/DL (ref 5–40)
TRIGL SERPL-MCNC: 202 MG/DL (ref 0–149)

## 2022-02-23 ENCOUNTER — OFFICE VISIT (OUTPATIENT)
Dept: FAMILY MEDICINE CLINIC | Facility: HOSPITAL | Age: 47
End: 2022-02-23
Payer: COMMERCIAL

## 2022-02-23 VITALS
DIASTOLIC BLOOD PRESSURE: 76 MMHG | OXYGEN SATURATION: 98 % | SYSTOLIC BLOOD PRESSURE: 134 MMHG | TEMPERATURE: 97.1 F | WEIGHT: 301.2 LBS | BODY MASS INDEX: 42.17 KG/M2 | HEART RATE: 70 BPM | HEIGHT: 71 IN

## 2022-02-23 DIAGNOSIS — E78.2 MIXED HYPERLIPIDEMIA: ICD-10-CM

## 2022-02-23 DIAGNOSIS — R73.01 IMPAIRED FASTING GLUCOSE: Primary | ICD-10-CM

## 2022-02-23 DIAGNOSIS — I10 ESSENTIAL HYPERTENSION: ICD-10-CM

## 2022-02-23 PROCEDURE — 3078F DIAST BP <80 MM HG: CPT | Performed by: NURSE PRACTITIONER

## 2022-02-23 PROCEDURE — 3725F SCREEN DEPRESSION PERFORMED: CPT | Performed by: NURSE PRACTITIONER

## 2022-02-23 PROCEDURE — 1036F TOBACCO NON-USER: CPT | Performed by: NURSE PRACTITIONER

## 2022-02-23 PROCEDURE — 3075F SYST BP GE 130 - 139MM HG: CPT | Performed by: NURSE PRACTITIONER

## 2022-02-23 PROCEDURE — 99214 OFFICE O/P EST MOD 30 MIN: CPT | Performed by: NURSE PRACTITIONER

## 2022-02-23 PROCEDURE — 3008F BODY MASS INDEX DOCD: CPT | Performed by: NURSE PRACTITIONER

## 2022-02-23 NOTE — PROGRESS NOTES
Assessment/Plan:    Essential hypertension  BP well controlled  Continue on current regimen  F/U in 6 months  Mixed hyperlipidemia  TG continue to be elevated with elevated LDL  Discussed cutting back on alcohol intake and start exercising  Will recheck in 6 months and f/u in 6 months  Consider fenofibrate if TG continue to climb  Impaired fasting glucose  Last A1C was excellent  Recheck in 6 months       Diagnoses and all orders for this visit:    Impaired fasting glucose  -     Comprehensive metabolic panel; Future  -     Hemoglobin A1C; Future  -     Comprehensive metabolic panel  -     Hemoglobin A1C    Essential hypertension  -     CBC and differential; Future  -     Comprehensive metabolic panel; Future  -     CBC and differential  -     Comprehensive metabolic panel    Mixed hyperlipidemia  -     CBC and differential; Future  -     Comprehensive metabolic panel; Future  -     Lipid panel; Future  -     CBC and differential  -     Comprehensive metabolic panel  -     Lipid panel          Subjective:      Patient ID: Lucy Corey is a 51 y o  male  Pt presents for a follow up visit pertaining to his blood work  Bloodwork reviwed  Pt states that he eats okay  Pt frequently eats fruits and vegetables  Eats three meals a day  Limited snacking  Limited junk food and fast food  Consumes decalf diet sugar free tea  Pt reports drinking 2-3 beers 3-4 days a week  Reports he has some level of anxiety, but not interested in anxiety medication  Claims he is high strung  Pt claims he does not currently exercise because he "is lazy"  Would rather be doing other things  Pt has gym equipment at home, just looking to establish routine  Educated on the effect of exercise on bloodwork         The following portions of the patient's history were reviewed and updated as appropriate: allergies, current medications, past family history, past medical history, past social history, past surgical history and problem list     Review of Systems   Constitutional: Negative for unexpected weight change  Eyes: Negative for visual disturbance  Respiratory: Negative for shortness of breath  Cardiovascular: Negative for chest pain, palpitations and leg swelling  Neurological: Negative for dizziness, weakness, light-headedness, numbness and headaches  Objective:  Vitals:    02/23/22 1606   BP: 134/76   Pulse: 70   Temp: (!) 97 1 °F (36 2 °C)   SpO2: 98%      Physical Exam  Vitals reviewed  Constitutional:       Appearance: Normal appearance  He is well-developed  He is obese  Cardiovascular:      Rate and Rhythm: Normal rate and regular rhythm  Pulses:           Carotid pulses are 2+ on the right side and 2+ on the left side  Heart sounds: Normal heart sounds  No murmur heard  Pulmonary:      Effort: Pulmonary effort is normal       Breath sounds: Normal breath sounds  Skin:     General: Skin is warm and dry  Neurological:      Mental Status: He is alert and oriented to person, place, and time  Psychiatric:         Mood and Affect: Mood normal          Behavior: Behavior normal          Thought Content: Thought content normal          Judgment: Judgment normal          BMI Counseling: Body mass index is 42 61 kg/m²  The BMI is above normal  Nutrition recommendations include reducing portion sizes, decreasing overall calorie intake, 3-5 servings of fruits/vegetables daily, reducing fast food intake, consuming healthier snacks, decreasing soda and/or juice intake, moderation in carbohydrate intake and increasing intake of lean protein  Exercise recommendations include moderate aerobic physical activity for 150 minutes/week

## 2022-02-23 NOTE — PATIENT INSTRUCTIONS
Obesity   AMBULATORY CARE:   Obesity  is when your body mass index (BMI) is greater than 30  Your healthcare provider will use your height and weight to measure your BMI  The risks of obesity include  many health problems, including injuries or physical disability  You may need tests to check for the following:  · Diabetes    · High blood pressure or high cholesterol    · Heart disease    · Stroke    · Gallbladder or liver disease    · Cancer of the colon, breast, prostate, liver, or kidney    · Sleep apnea    · Arthritis or gout    Seek care immediately if:   · You have a severe headache, confusion, or difficulty speaking  · You have weakness on one side of your body  · You have chest pain, sweating, or shortness of breath  Call your doctor if:   · You have symptoms of gallbladder or liver disease, such as pain in your upper abdomen  · You have knee or hip pain and discomfort while walking  · You have symptoms of diabetes, such as intense hunger and thirst, and frequent urination  · You have symptoms of sleep apnea, such as snoring or daytime sleepiness  · You have questions or concerns about your condition or care  Treatment for obesity  focuses on helping you lose weight to improve your health  Even a small decrease in BMI can reduce the risk for many health problems  Your healthcare provider will help you set a weight-loss goal   · Lifestyle changes  are the first step in treating obesity  These include making healthy food choices and getting regular physical activity  Your healthcare provider may suggest a weight-loss program that involves coaching, education, and therapy  · Medicine  may help you lose weight when it is used with a healthy foods and physical activity  · Surgery  can help you lose weight if you are very obese and have other health problems  There are several types of weight-loss surgery  Ask your healthcare provider for more information      Be successful losing weight:   · Set small, realistic goals  An example of a small goal is to walk for 20 minutes 5 days a week  Anther goal is to lose 5% of your body weight  · Tell friends, family members, and coworkers about your goals  and ask for their support  Ask a friend to lose weight with you, or join a weight-loss support group  · Identify foods or triggers that may cause you to overeat , and find ways to avoid them  Remove tempting high-calorie foods from your home and workplace  Place a bowl of fresh fruit on your kitchen counter  If stress causes you to eat, then find other ways to cope with stress  A counselor or therapist may be able to help you  · Keep a diary to track what you eat and drink  Also write down how many minutes of physical activity you do each day  Weigh yourself once a week and record it in your diary  Eating changes: You will need to eat 500 to 1,000 fewer calories each day than you currently eat to lose 1 to 2 pounds a week  The following changes will help you cut calories:  · Eat smaller portions  Use small plates, no larger than 9 inches in diameter  Fill your plate half full of fruits and vegetables  Measure your food using measuring cups until you know what a serving size looks like  · Eat 3 meals and 1 or 2 snacks each day  Plan your meals in advance  Bertha Cano and eat at home most of the time  Eat slowly  Do not skip meals  Skipping meals can lead to overeating later in the day  This can make it harder for you to lose weight  Talk with a dietitian to help you make a meal plan and schedule that is right for you  · Eat fruits and vegetables at every meal   They are low in calories and high in fiber, which makes you feel full  Do not add butter, margarine, or cream sauce to vegetables  Use herbs to season steamed vegetables  · Eat less fat and fewer fried foods  Eat more baked or grilled chicken and fish  These protein sources are lower in calories and fat than red meat  Limit fast food  Dress your salads with olive oil and vinegar instead of bottled dressing  · Limit the amount of sugar you eat  Do not drink sugary beverages  Limit alcohol  Activity changes:  Physical activity is good for your body in many ways  It helps you burn calories and build strong muscles  It decreases stress and depression, and improves your mood  It can also help you sleep better  Talk to your healthcare provider before you begin an exercise program   · Exercise for at least 30 minutes 5 days a week  Start slowly  Set aside time each day for physical activity that you enjoy and that is convenient for you  It is best to do both weight training and an activity that increases your heart rate, such as walking, bicycling, or swimming  · Find ways to be more active  Do yard work and housecleaning  Walk up the stairs instead of using elevators  Spend your leisure time going to events that require walking, such as outdoor festivals or fairs  This extra physical activity can help you lose weight and keep it off  Follow up with your doctor as directed: You may need to meet with a dietitian  Write down your questions so you remember to ask them during your visits  © Copyright [employer] 2021 Information is for End User's use only and may not be sold, redistributed or otherwise used for commercial purposes  All illustrations and images included in CareNotes® are the copyrighted property of A D A M , Inc  or Fredy Morrow  The above information is an  only  It is not intended as medical advice for individual conditions or treatments  Talk to your doctor, nurse or pharmacist before following any medical regimen to see if it is safe and effective for you

## 2022-02-23 NOTE — ASSESSMENT & PLAN NOTE
TG continue to be elevated with elevated LDL  Discussed cutting back on alcohol intake and start exercising  Will recheck in 6 months and f/u in 6 months  Consider fenofibrate if TG continue to climb

## 2022-03-25 DIAGNOSIS — I10 ESSENTIAL HYPERTENSION: ICD-10-CM

## 2022-03-26 RX ORDER — HYDROCHLOROTHIAZIDE 25 MG/1
TABLET ORAL
Qty: 90 TABLET | Refills: 3 | Status: SHIPPED | OUTPATIENT
Start: 2022-03-26

## 2022-03-26 RX ORDER — LISINOPRIL 20 MG/1
TABLET ORAL
Qty: 90 TABLET | Refills: 3 | Status: SHIPPED | OUTPATIENT
Start: 2022-03-26

## 2022-05-10 ENCOUNTER — OFFICE VISIT (OUTPATIENT)
Dept: SLEEP CENTER | Facility: HOSPITAL | Age: 47
End: 2022-05-10
Payer: COMMERCIAL

## 2022-05-10 VITALS
BODY MASS INDEX: 42 KG/M2 | WEIGHT: 300 LBS | SYSTOLIC BLOOD PRESSURE: 150 MMHG | HEIGHT: 71 IN | DIASTOLIC BLOOD PRESSURE: 90 MMHG

## 2022-05-10 DIAGNOSIS — I10 ESSENTIAL HYPERTENSION: ICD-10-CM

## 2022-05-10 DIAGNOSIS — E66.01 MORBID OBESITY WITH BMI OF 40.0-44.9, ADULT (HCC): ICD-10-CM

## 2022-05-10 DIAGNOSIS — G47.33 OSA (OBSTRUCTIVE SLEEP APNEA): Primary | ICD-10-CM

## 2022-05-10 DIAGNOSIS — R40.0 DAYTIME SLEEPINESS: ICD-10-CM

## 2022-05-10 DIAGNOSIS — F51.12 INSUFFICIENT SLEEP SYNDROME: ICD-10-CM

## 2022-05-10 DIAGNOSIS — R68.2 DRY MOUTH: ICD-10-CM

## 2022-05-10 PROCEDURE — 3080F DIAST BP >= 90 MM HG: CPT | Performed by: INTERNAL MEDICINE

## 2022-05-10 PROCEDURE — 3077F SYST BP >= 140 MM HG: CPT | Performed by: INTERNAL MEDICINE

## 2022-05-10 PROCEDURE — 1036F TOBACCO NON-USER: CPT | Performed by: INTERNAL MEDICINE

## 2022-05-10 PROCEDURE — 99214 OFFICE O/P EST MOD 30 MIN: CPT | Performed by: INTERNAL MEDICINE

## 2022-05-10 PROCEDURE — 3008F BODY MASS INDEX DOCD: CPT | Performed by: INTERNAL MEDICINE

## 2022-05-10 NOTE — PROGRESS NOTES
Follow-Up Note - Abel 45  51 y o  male  :1975  KZF:[id number]  DOS:5/10/2022    CC: I saw this patient for follow-up in clinic today for Sleep disordered breathing, Coexisting Sleep and Medical Problems  He is using a ResMed machine that he got in     A home sleep study in 2020 demonstrated respiratory event index of 87 8 hour  Minimal oxygen saturation was 63% and 71 2% of the study was spent with saturations below 90%  A small index was 60 9%  During a subsequent therapeutic study, sleep disordered breathing was successfully remediated with BiPAP at 21/17 cm H2O  PFSH, Problem List, Medications & Allergies were reviewed in EMR  Interval changes: none reported  He  has a past medical history of Hyperkalemia (2020) and Hypertension  He has a current medication list which includes the following prescription(s): hydrochlorothiazide and lisinopril  PHYSIOLOGICAL DATA REVIEW AND INTERPRETATION:    using PAP > 4 hours/night 93%  Estimated TAMIE 1 5/hour with pressure of 21/17cm H2O ; Patient has been using ozone based devices to sanitize mask hose and water chamber but not the machine  SUBJECTIVE: Regarding use of PAP, Nicolas reports:   · He is experiencing no adverse effects:  Dry mouth is controlled ; has not noticed any fibres or foreign material in air line  · He is benefiting from use: sleeping better   Sleep Routine: Hubert Matamoros reports getting 6-7 hrs sleep  ; he has no difficulty initiating or maintaining sleep   He arises spontaneously and feels refreshed  Hubert Matamoros denies Excessive Daytime Sleepiness,   He rated himself at Total score: 6 /24 on the [place] Sleepiness Scale  Habits: reports that he has never smoked  He has never used smokeless tobacco ,  reports current alcohol use ,  reports no history of drug use , Caffeine use:none ; Exercise routine: sometimes   ROS: reviewed & as attached  Significant for weight has been stable  He reported no nasal, respiratory or cardiac symptoms  EXAM: /90 (BP Location: Right arm, Patient Position: Sitting, Cuff Size: Large)   Ht 5' 10 5" (1 791 m)   Wt 136 kg (300 lb)   BMI 42 44 kg/m²     Wt Readings from Last 3 Encounters:   05/10/22 136 kg (300 lb)   02/23/22 (!) 137 kg (301 lb 3 2 oz)   08/19/21 134 kg (295 lb 6 4 oz)      Patient is well groomed; well appearing  CNS: Alert, orientated, clear & coherent speech  Psych: cooperativeand in no distress  Mental state:appears normal   H&N: EOMI; NC/AT:no facial pressure marks, no rashes  Skin/Extrem: col & hydration normal; no edema  Resp: Respiratory effort is normal  Physical findings otherwise essentially unchanged from previous  IMPRESSION: Problem List Items & Comorbidities Addressed this Visit    1  ANTWAN (obstructive sleep apnea)  PAP DME Resupply/Reorder   2  Insufficient sleep syndrome     3  Dry mouth     4  Essential hypertension     5  Daytime sleepiness     6  Morbid obesity with BMI of 40 0-44 9, adult ([facility] [address] )         PLAN:  1  I reviewed results of prior studies and physiologic data with the patient  2  I discussed treatment options with risks and benefits  3  Treatment with  PAP is medically necessary and Bev Grange is agreable to continue use  4  Care of equipment, methods to improve comfort using PAP and importance of compliance with therapy were discussed  5  Pressure setting: continue 21/17 cmH2O     6  Rx provided to replace  supplies and Care coordinated with DME provider  7  Discussed strategies for weight reduction  8  Follow-up is advised in 1 year or sooner if needed to monitor progress, compliance and to adjust therapy  Thank you for allowing me to participate in the care of this patient  Sincerely,    Authenticated electronically by Enedelia Franklin MD on 55/49/34   Board Certified Specialist     Portions of the record may have been created with voice recognition software   Occasional wrong word or "sound a like" substitutions may have occurred due to the inherent limitations of voice recognition software  There may also be notations and random deletions of words or characters from malfunctioning software  Read the chart carefully and recognize, using context, where substitutions/deletions have occurred

## 2022-05-10 NOTE — PATIENT INSTRUCTIONS

## 2022-05-11 ENCOUNTER — TELEPHONE (OUTPATIENT)
Dept: SLEEP CENTER | Facility: CLINIC | Age: 47
End: 2022-05-11

## 2022-05-11 LAB

## 2022-05-11 NOTE — TELEPHONE ENCOUNTER
Rx for PAP resupply and office notes sent to [address] [place] [address] via [address]

## 2022-05-12 ENCOUNTER — TELEPHONE (OUTPATIENT)
Dept: SLEEP CENTER | Facility: CLINIC | Age: 47
End: 2022-05-12

## 2022-05-12 NOTE — TELEPHONE ENCOUNTER
Returned the patient's call   Left call back message and code for ANTWAN [phone] patient left message asking for it

## 2022-05-19 NOTE — TELEPHONE ENCOUNTER
Patient left message stating the code we gave him is his diagnosis code  States his insurance needs to know the CPT code in order to know what supplies he can get refilled for his CPAP machine  States the code should be a letter follow by 4 ni Simmons, I do not know what the patient is referring to  The office staff does not know either  Do you happen to know?

## 2022-05-20 NOTE — TELEPHONE ENCOUNTER
Detail Level: Detailed Left message for patient and advised that per our Lois Barrios liaison, he should call [facility] resupply department to discuss CPT code  Phone number provided in message  [phone]

## 2022-05-20 NOTE — TELEPHONE ENCOUNTER
His best bet is calling the resupply department at [phone]  The resupply department is the one who gives the insurance the CPT codes for the supplies  The patient shouldn't have too

## 2022-08-02 ENCOUNTER — OFFICE VISIT (OUTPATIENT)
Dept: FAMILY MEDICINE CLINIC | Facility: HOSPITAL | Age: 47
End: 2022-08-02
Payer: COMMERCIAL

## 2022-08-02 VITALS
SYSTOLIC BLOOD PRESSURE: 144 MMHG | OXYGEN SATURATION: 98 % | WEIGHT: 297.6 LBS | DIASTOLIC BLOOD PRESSURE: 86 MMHG | TEMPERATURE: 97 F | BODY MASS INDEX: 41.66 KG/M2 | HEIGHT: 71 IN | HEART RATE: 80 BPM

## 2022-08-02 DIAGNOSIS — K64.9 BLEEDING HEMORRHOID: ICD-10-CM

## 2022-08-02 DIAGNOSIS — H60.391 OTHER INFECTIVE ACUTE OTITIS EXTERNA OF RIGHT EAR: ICD-10-CM

## 2022-08-02 DIAGNOSIS — H66.001 NON-RECURRENT ACUTE SUPPURATIVE OTITIS MEDIA OF RIGHT EAR WITHOUT SPONTANEOUS RUPTURE OF TYMPANIC MEMBRANE: Primary | ICD-10-CM

## 2022-08-02 PROCEDURE — 3077F SYST BP >= 140 MM HG: CPT | Performed by: NURSE PRACTITIONER

## 2022-08-02 PROCEDURE — 3079F DIAST BP 80-89 MM HG: CPT | Performed by: NURSE PRACTITIONER

## 2022-08-02 PROCEDURE — 99214 OFFICE O/P EST MOD 30 MIN: CPT | Performed by: NURSE PRACTITIONER

## 2022-08-02 RX ORDER — AMOXICILLIN AND CLAVULANATE POTASSIUM 875; 125 MG/1; MG/1
1 TABLET, FILM COATED ORAL EVERY 12 HOURS SCHEDULED
Qty: 20 TABLET | Refills: 0 | Status: SHIPPED | OUTPATIENT
Start: 2022-08-02 | End: 2022-08-12

## 2022-08-02 NOTE — PROGRESS NOTES
Assessment/Plan:     Acute otitis externa with concurrent AOM  Treat with Augmentin  Recommend OTC Tucks medicated pads along with preparation-H for hemorrhoid relief  Call if no better or worsening  Diagnoses and all orders for this visit:    Non-recurrent acute suppurative otitis media of right ear without spontaneous rupture of tympanic membrane  -     amoxicillin-clavulanate (AUGMENTIN) 875-125 mg per tablet; Take 1 tablet by mouth every 12 (twelve) hours for 10 days    Other infective acute otitis externa of right ear  -     amoxicillin-clavulanate (AUGMENTIN) 875-125 mg per tablet; Take 1 tablet by mouth every 12 (twelve) hours for 10 days    Bleeding hemorrhoid          Subjective:     Patient ID: Anderson Taylor is a 51 y o  male  Right ear is clogged  , recent vacation with being in pool  Started about 4 days ago  Has h/o ear wax impactions  Has pain  10 years ago had something growing in his ear from [facility]  Has clear drainage and yellow crusting which is minimal  Used ear wax softening drops  Also with hemorrhoid  Has never had one before  Started to bleed today  He has not used anything on it  A little less painful today  Review of Systems   HENT: Positive for ear discharge, ear pain and hearing loss  Negative for tinnitus  Gastrointestinal: Positive for anal bleeding and rectal pain  Hemorroid         The following portions of the patient's history were reviewed and updated as appropriate: allergies, current medications, past family history, past medical history, past social history, past surgical history and problem list     Objective:  Vitals:    08/02/22 1106   BP: 144/86   Pulse: 80   Temp: (!) 97 °F (36 1 °C)   SpO2: 98%      Physical Exam  Vitals reviewed  Constitutional:       Appearance: Normal appearance  He is obese  HENT:      Right Ear: Swelling and tenderness present  No drainage  Tympanic membrane is erythematous and bulging        Left Ear: Tympanic membrane, ear canal and external ear normal    Cardiovascular:      Rate and Rhythm: Normal rate and regular rhythm  Heart sounds: Normal heart sounds  Pulmonary:      Effort: Pulmonary effort is normal       Breath sounds: Normal breath sounds  Skin:     General: Skin is warm and dry  Neurological:      Mental Status: He is alert and oriented to person, place, and time  Psychiatric:         Mood and Affect: Mood normal          Behavior: Behavior normal          Thought Content:  Thought content normal          Judgment: Judgment normal

## 2022-12-27 ENCOUNTER — OFFICE VISIT (OUTPATIENT)
Dept: FAMILY MEDICINE CLINIC | Facility: HOSPITAL | Age: 47
End: 2022-12-27

## 2022-12-27 VITALS
WEIGHT: 309.8 LBS | SYSTOLIC BLOOD PRESSURE: 134 MMHG | HEART RATE: 96 BPM | TEMPERATURE: 98.8 F | HEIGHT: 71 IN | BODY MASS INDEX: 43.37 KG/M2 | DIASTOLIC BLOOD PRESSURE: 92 MMHG

## 2022-12-27 DIAGNOSIS — R21 RASH OF FACE: Primary | ICD-10-CM

## 2022-12-27 DIAGNOSIS — Z12.11 ENCOUNTER FOR SCREENING COLONOSCOPY: ICD-10-CM

## 2022-12-27 NOTE — PROGRESS NOTES
Assessment/Plan:     Severe face rash that does have butterfly appearance  Check AI labs      Refer to [facility] dermatology for second opinion  Diagnoses and all orders for this visit:    Rash of face  -     BRET Comprehensive Panel; Future  -     CBC and differential; Future  -     Comprehensive metabolic panel; Future  -     TSH, 3rd generation with Free T4 reflex; Future  -     Ambulatory Referral to Dermatology; Future  -     BRET Comprehensive Panel  -     CBC and differential  -     Comprehensive metabolic panel  -     TSH, 3rd generation with Free T4 reflex    Encounter for screening colonoscopy  -     Ambulatory referral for colonoscopy; Future          Subjective:     Patient ID: Meghan Nieves is a 49 y o  male  Rash on face  Gets pustular  Can expel brown pus  Would get spots and go away  The last week has been really bad  Starts as hard spot then spreads  Has seen derm who prescribed oral and topical antibiotics  Very painful  Dad had Lupus  Sister has psoriatic arthitris  Niece has Ulcerative colitis  Denies any joint pain  No fever or chills  Denies rash anywhere else on body  Review of Systems   Constitutional: Negative for chills and fever  Musculoskeletal: Negative for arthralgias  Skin: Positive for rash  The following portions of the patient's history were reviewed and updated as appropriate: allergies, current medications, past family history, past medical history, past social history, past surgical history and problem list     Objective:  Vitals:    12/27/22 1755   BP: 134/92   Pulse: 96   Temp: 98 8 °F (37 1 °C)      Physical Exam  Vitals reviewed  Constitutional:       Appearance: Normal appearance  Cardiovascular:      Rate and Rhythm: Normal rate and regular rhythm  Heart sounds: Normal heart sounds  Pulmonary:      Effort: Pulmonary effort is normal       Breath sounds: Normal breath sounds  Skin:     General: Skin is warm and dry        Findings: Rash present  Comments: Rash on entire face  Red pustular on erythematous base  Angry appearing  Neurological:      Mental Status: He is alert and oriented to person, place, and time  Psychiatric:         Mood and Affect: Mood normal          Behavior: Behavior normal          Thought Content:  Thought content normal          Judgment: Judgment normal

## 2022-12-29 LAB
ALBUMIN SERPL-MCNC: 4.5 G/DL (ref 4–5)
ALBUMIN/GLOB SERPL: 2 {RATIO} (ref 1.2–2.2)
ALP SERPL-CCNC: 44 IU/L (ref 44–121)
ALT SERPL-CCNC: 18 IU/L (ref 0–44)
AST SERPL-CCNC: 16 IU/L (ref 0–40)
BASOPHILS # BLD AUTO: 0 X10E3/UL (ref 0–0.2)
BASOPHILS NFR BLD AUTO: 0 %
BILIRUB SERPL-MCNC: 0.3 MG/DL (ref 0–1.2)
BUN SERPL-MCNC: 20 MG/DL (ref 6–24)
BUN/CREAT SERPL: 22 (ref 9–20)
CALCIUM SERPL-MCNC: 10 MG/DL (ref 8.7–10.2)
CENTROMERE B AB SER-ACNC: <0.2 AI (ref 0–0.9)
CHLORIDE SERPL-SCNC: 107 MMOL/L (ref 96–106)
CHROMATIN AB SERPL-ACNC: <0.2 AI (ref 0–0.9)
CO2 SERPL-SCNC: 25 MMOL/L (ref 20–29)
CREAT SERPL-MCNC: 0.89 MG/DL (ref 0.76–1.27)
DSDNA AB SER-ACNC: 13 IU/ML (ref 0–9)
EGFR: 106 ML/MIN/1.73
ENA JO1 AB SER-ACNC: <0.2 AI (ref 0–0.9)
ENA RNP AB SER-ACNC: 0.2 AI (ref 0–0.9)
ENA SCL70 AB SER-ACNC: 0.2 AI (ref 0–0.9)
ENA SM AB SER-ACNC: <0.2 AI (ref 0–0.9)
ENA SS-A AB SER-ACNC: <0.2 AI (ref 0–0.9)
ENA SS-B AB SER-ACNC: <0.2 AI (ref 0–0.9)
EOSINOPHIL # BLD AUTO: 0.1 X10E3/UL (ref 0–0.4)
EOSINOPHIL NFR BLD AUTO: 1 %
ERYTHROCYTE [DISTWIDTH] IN BLOOD BY AUTOMATED COUNT: 12.4 % (ref 11.6–15.4)
GLOBULIN SER-MCNC: 2.3 G/DL (ref 1.5–4.5)
GLUCOSE SERPL-MCNC: 134 MG/DL (ref 70–99)
HCT VFR BLD AUTO: 38.5 % (ref 37.5–51)
HGB BLD-MCNC: 13.2 G/DL (ref 13–17.7)
IMM GRANULOCYTES # BLD: 0.1 X10E3/UL (ref 0–0.1)
IMM GRANULOCYTES NFR BLD: 1 %
LYMPHOCYTES # BLD AUTO: 1.6 X10E3/UL (ref 0.7–3.1)
LYMPHOCYTES NFR BLD AUTO: 18 %
MCH RBC QN AUTO: 29.3 PG (ref 26.6–33)
MCHC RBC AUTO-ENTMCNC: 34.3 G/DL (ref 31.5–35.7)
MCV RBC AUTO: 85 FL (ref 79–97)
MONOCYTES # BLD AUTO: 1.1 X10E3/UL (ref 0.1–0.9)
MONOCYTES NFR BLD AUTO: 11 %
NEUTROPHILS # BLD AUTO: 6.4 X10E3/UL (ref 1.4–7)
NEUTROPHILS NFR BLD AUTO: 69 %
PLATELET # BLD AUTO: 301 X10E3/UL (ref 150–450)
POTASSIUM SERPL-SCNC: 5.1 MMOL/L (ref 3.5–5.2)
PROT SERPL-MCNC: 6.8 G/DL (ref 6–8.5)
RBC # BLD AUTO: 4.51 X10E6/UL (ref 4.14–5.8)
SL AMB SEE BELOW:: ABNORMAL
SODIUM SERPL-SCNC: 144 MMOL/L (ref 134–144)
TSH SERPL DL<=0.005 MIU/L-ACNC: 1.66 UIU/ML (ref 0.45–4.5)
WBC # BLD AUTO: 9.2 X10E3/UL (ref 3.4–10.8)

## 2023-01-03 DIAGNOSIS — R76.8 DS DNA ANTIBODY POSITIVE: Primary | ICD-10-CM

## 2023-03-23 DIAGNOSIS — I10 ESSENTIAL HYPERTENSION: ICD-10-CM

## 2023-03-23 RX ORDER — LISINOPRIL 20 MG/1
20 TABLET ORAL DAILY
Qty: 30 TABLET | Refills: 0 | Status: SHIPPED | OUTPATIENT
Start: 2023-03-23 | End: 2023-04-25 | Stop reason: SDUPTHER

## 2023-03-23 RX ORDER — HYDROCHLOROTHIAZIDE 25 MG/1
25 TABLET ORAL DAILY
Qty: 30 TABLET | Refills: 0 | Status: SHIPPED | OUTPATIENT
Start: 2023-03-23 | End: 2023-04-25 | Stop reason: SDUPTHER

## 2023-04-25 DIAGNOSIS — R73.01 IMPAIRED FASTING GLUCOSE: Primary | ICD-10-CM

## 2023-04-25 DIAGNOSIS — E78.2 MIXED HYPERLIPIDEMIA: ICD-10-CM

## 2023-04-25 DIAGNOSIS — I10 ESSENTIAL HYPERTENSION: ICD-10-CM

## 2023-04-25 RX ORDER — HYDROCHLOROTHIAZIDE 25 MG/1
25 TABLET ORAL DAILY
Qty: 30 TABLET | Refills: 0 | Status: SHIPPED | OUTPATIENT
Start: 2023-04-25

## 2023-04-25 RX ORDER — LISINOPRIL 20 MG/1
20 TABLET ORAL DAILY
Qty: 30 TABLET | Refills: 0 | Status: SHIPPED | OUTPATIENT
Start: 2023-04-25

## 2023-04-28 LAB
ALBUMIN SERPL-MCNC: 4.6 G/DL (ref 4–5)
ALBUMIN/GLOB SERPL: 1.8 {RATIO} (ref 1.2–2.2)
ALP SERPL-CCNC: 39 IU/L (ref 44–121)
ALT SERPL-CCNC: 28 IU/L (ref 0–44)
AST SERPL-CCNC: 17 IU/L (ref 0–40)
BASOPHILS # BLD AUTO: 0 X10E3/UL (ref 0–0.2)
BASOPHILS NFR BLD AUTO: 0 %
BILIRUB SERPL-MCNC: 0.8 MG/DL (ref 0–1.2)
BUN SERPL-MCNC: 17 MG/DL (ref 6–24)
BUN/CREAT SERPL: 18 (ref 9–20)
CALCIUM SERPL-MCNC: 9.9 MG/DL (ref 8.7–10.2)
CHLORIDE SERPL-SCNC: 102 MMOL/L (ref 96–106)
CHOLEST SERPL-MCNC: 208 MG/DL (ref 100–199)
CHOLEST/HDLC SERPL: 4.5 RATIO (ref 0–5)
CO2 SERPL-SCNC: 23 MMOL/L (ref 20–29)
CREAT SERPL-MCNC: 0.95 MG/DL (ref 0.76–1.27)
EGFR: 99 ML/MIN/1.73
EOSINOPHIL # BLD AUTO: 0.1 X10E3/UL (ref 0–0.4)
EOSINOPHIL NFR BLD AUTO: 1 %
ERYTHROCYTE [DISTWIDTH] IN BLOOD BY AUTOMATED COUNT: 13.3 % (ref 11.6–15.4)
EST. AVERAGE GLUCOSE BLD GHB EST-MCNC: 105 MG/DL
GLOBULIN SER-MCNC: 2.5 G/DL (ref 1.5–4.5)
GLUCOSE SERPL-MCNC: 126 MG/DL (ref 70–99)
HBA1C MFR BLD: 5.3 % (ref 4.8–5.6)
HCT VFR BLD AUTO: 40.8 % (ref 37.5–51)
HDLC SERPL-MCNC: 46 MG/DL
HGB BLD-MCNC: 14.3 G/DL (ref 13–17.7)
IMM GRANULOCYTES # BLD: 0.2 X10E3/UL (ref 0–0.1)
IMM GRANULOCYTES NFR BLD: 2 %
LDLC SERPL CALC-MCNC: 122 MG/DL (ref 0–99)
LYMPHOCYTES # BLD AUTO: 1.3 X10E3/UL (ref 0.7–3.1)
LYMPHOCYTES NFR BLD AUTO: 15 %
MCH RBC QN AUTO: 30.4 PG (ref 26.6–33)
MCHC RBC AUTO-ENTMCNC: 35 G/DL (ref 31.5–35.7)
MCV RBC AUTO: 87 FL (ref 79–97)
MONOCYTES # BLD AUTO: 0.9 X10E3/UL (ref 0.1–0.9)
MONOCYTES NFR BLD AUTO: 10 %
NEUTROPHILS # BLD AUTO: 6.3 X10E3/UL (ref 1.4–7)
NEUTROPHILS NFR BLD AUTO: 72 %
PLATELET # BLD AUTO: 267 X10E3/UL (ref 150–450)
POTASSIUM SERPL-SCNC: 5.6 MMOL/L (ref 3.5–5.2)
PROT SERPL-MCNC: 7.1 G/DL (ref 6–8.5)
RBC # BLD AUTO: 4.7 X10E6/UL (ref 4.14–5.8)
SL AMB VLDL CHOLESTEROL CALC: 40 MG/DL (ref 5–40)
SODIUM SERPL-SCNC: 140 MMOL/L (ref 134–144)
TRIGL SERPL-MCNC: 227 MG/DL (ref 0–149)
WBC # BLD AUTO: 8.8 X10E3/UL (ref 3.4–10.8)

## 2023-05-04 ENCOUNTER — OFFICE VISIT (OUTPATIENT)
Dept: FAMILY MEDICINE CLINIC | Facility: HOSPITAL | Age: 48
End: 2023-05-04

## 2023-05-04 VITALS
HEIGHT: 71 IN | TEMPERATURE: 98.4 F | DIASTOLIC BLOOD PRESSURE: 88 MMHG | WEIGHT: 311.6 LBS | BODY MASS INDEX: 43.62 KG/M2 | SYSTOLIC BLOOD PRESSURE: 144 MMHG | OXYGEN SATURATION: 97 % | HEART RATE: 94 BPM

## 2023-05-04 DIAGNOSIS — E78.2 MIXED HYPERLIPIDEMIA: ICD-10-CM

## 2023-05-04 DIAGNOSIS — I10 ESSENTIAL HYPERTENSION: ICD-10-CM

## 2023-05-04 DIAGNOSIS — R73.01 IMPAIRED FASTING GLUCOSE: ICD-10-CM

## 2023-05-04 DIAGNOSIS — Z00.00 ANNUAL PHYSICAL EXAM: Primary | ICD-10-CM

## 2023-05-04 RX ORDER — DOXYCYCLINE 100 MG/1
100 CAPSULE ORAL 2 TIMES DAILY WITH MEALS
COMMUNITY
Start: 2023-04-07

## 2023-05-04 RX ORDER — PREDNISONE 10 MG/1
TABLET ORAL
COMMUNITY
Start: 2023-03-27

## 2023-05-04 NOTE — ASSESSMENT & PLAN NOTE
TG higher  He is drinking at least 36 beers/week  We discussed alcohol reduction  He is willing to try this  Plan to recheck in 6 months

## 2023-05-04 NOTE — PROGRESS NOTES
ADULT ANNUAL [address] [facility] [address]     NAME: William Chu  AGE: 49 y o  SEX: male  : 1975     DATE: 2023     Assessment and Plan:     Problem List Items Addressed This Visit        Endocrine    Impaired fasting glucose     FG is elevated but his A1C is excellent  He has been on prednisone and is currently weaning off  Cardiovascular and Mediastinum    Essential hypertension     Bp is elevated today  Pt reports lower home readings  He is under a lot of stress and his alcohol intake is high  We discussed reducing alcohol intake  Offered resources for [facility] but he is declining  He will continue with diet and exercise  No changes to medication regimen  F/U in 6 months  Other    Mixed hyperlipidemia     TG higher  He is drinking at least 36 beers/week  We discussed alcohol reduction  He is willing to try this  Plan to recheck in 6 months  Relevant Orders    Lipid panel   Other Visit Diagnoses     Annual physical exam    -  Primary          Immunizations and preventive care screenings were discussed with patient today  Appropriate education was printed on patient's after visit summary  Counseling:  Alcohol/drug use: discussed moderation in alcohol intake, the recommendations for healthy alcohol use, and avoidance of illicit drug use  Dental Health: discussed importance of regular tooth brushing, flossing, and dental visits  Injury prevention: discussed safety/seat belts, safety helmets, smoke detectors, carbon dioxide detectors, and smoking near bedding or upholstery  Sexual health: discussed sexually transmitted diseases, partner selection, use of condoms, avoidance of unintended pregnancy, and contraceptive alternatives  · Exercise: the importance of regular exercise/physical activity was discussed  Recommend exercise 3-5 times per week for at least 30 minutes  BMI Counseling: Body mass index is 44 08 kg/m²  The BMI is above normal  Nutrition recommendations include decreasing portion sizes, encouraging healthy choices of fruits and vegetables, decreasing fast food intake, consuming healthier snacks, limiting drinks that contain sugar, moderation in carbohydrate intake, increasing intake of lean protein, reducing intake of saturated and trans fat and reducing intake of cholesterol  Exercise recommendations include moderate physical activity 150 minutes/week  No pharmacotherapy was ordered  Rationale for BMI follow-up plan is due to patient being overweight or obese  Return in about 6 months (around 11/4/2023) for Next scheduled follow up  Chief Complaint:     Chief Complaint   Patient presents with    Follow-up      History of Present Illness:     Adult Annual Physical   Patient here for a comprehensive physical exam  The patient reports no problems  Diet and Physical Activity  · Diet/Nutrition: well balanced diet  · Exercise: walking and 5-7 times a week on average  Depression Screening  PHQ-2/9 Depression Screening    Little interest or pleasure in doing things: 1 - several days  Feeling down, depressed, or hopeless: 1 - several days       General Health  · Sleep: sleeps well  · Hearing: normal - bilateral   · Vision: most recent eye exam >1 year ago  · Dental: regular dental visits   Health  · Symptoms include: none     Review of Systems:     Review of Systems   Constitutional: Negative  Negative for activity change, appetite change, fatigue, fever and unexpected weight change  HENT: Negative  Negative for congestion, ear pain, sinus pressure, sinus pain, sore throat, trouble swallowing and voice change  Eyes: Negative  Negative for visual disturbance  Respiratory: Negative  Negative for cough, chest tightness, shortness of breath and wheezing  Cardiovascular: Negative    Negative for chest pain, palpitations and leg swelling  Gastrointestinal: Negative  Negative for abdominal pain, blood in stool, constipation, diarrhea, nausea, rectal pain and vomiting  Endocrine: Negative  Genitourinary: Negative  Negative for decreased urine volume, difficulty urinating, dysuria, frequency and urgency  Musculoskeletal: Negative  Negative for arthralgias and myalgias  Skin: Positive for rash  Negative for wound  Neurological: Negative  Negative for dizziness, weakness, light-headedness, numbness and headaches  Hematological: Negative  Psychiatric/Behavioral: Negative for agitation, behavioral problems, confusion, decreased concentration, dysphoric mood, hallucinations, self-injury, sleep disturbance and suicidal ideas  The patient is nervous/anxious  The patient is not hyperactive  Past Medical History:     Past Medical History:   Diagnosis Date    Hyperkalemia 12/2/2020    Hypertension       Past Surgical History:     History reviewed  No pertinent surgical history  Family History:     Family History   Problem Relation Age of Onset    Hypertension Mother     Coronary artery disease Father     Heart disease Father     Lupus Father     Diabetes Father     Rheum arthritis Sister     Colon cancer Paternal Grandmother       Social History:     Social History     Socioeconomic History    Marital status: Single     Spouse name: None    Number of children: None    Years of education: None    Highest education level: None   Occupational History    None   Tobacco Use    Smoking status: Never    Smokeless tobacco: Never   Vaping Use    Vaping Use: Never used   Substance and Sexual Activity    Alcohol use:  Yes    Drug use: Never    Sexual activity: Yes   Other Topics Concern    None   Social History Narrative    None     Social Determinants of Health     Financial Resource Strain: Not on file   Food Insecurity: Not on file   Transportation Needs: Not on file   Physical Activity: Not on file   Stress: "Not on file   Social Connections: Not on file   Intimate Partner Violence: Not on file   Housing Stability: Not on file      Current Medications:     Current Outpatient Medications   Medication Sig Dispense Refill    doxycycline monohydrate (MONODOX) 100 mg capsule Take 100 mg by mouth 2 (two) times a day with meals      hydrochlorothiazide (HYDRODIURIL) 25 mg tablet Take 1 tablet (25 mg total) by mouth daily 30 tablet 0    lisinopril (ZESTRIL) 20 mg tablet Take 1 tablet (20 mg total) by mouth daily 30 tablet 0    predniSONE 10 mg tablet TAKE 2 TABLETS BY MOUTH DAILY FOR 2 WEEKS, THEN TAKE 1 TABLET BY MOUTH DAILY FO2 WEEKS, THEN STOP       No current facility-administered medications for this visit  Allergies:     No Known Allergies   Physical Exam:     /88 (BP Location: Left arm, Patient Position: Sitting, Cuff Size: Standard)   Pulse 94   Temp 98 4 °F (36 9 °C) (Tympanic)   Ht 5' 10 5\" (1 791 m)   Wt (!) 141 kg (311 lb 9 6 oz)   SpO2 97%   BMI 44 08 kg/m²     Physical Exam  Vitals reviewed  Constitutional:       Appearance: Normal appearance  HENT:      Head: Normocephalic and atraumatic  Right Ear: Tympanic membrane, ear canal and external ear normal       Left Ear: Tympanic membrane, ear canal and external ear normal       Nose: Nose normal       Mouth/Throat:      Mouth: Mucous membranes are moist       Pharynx: Oropharynx is clear  Eyes:      Conjunctiva/sclera: Conjunctivae normal       Pupils: Pupils are equal, round, and reactive to light  Neck:      Thyroid: No thyromegaly  Cardiovascular:      Rate and Rhythm: Normal rate and regular rhythm  Heart sounds: Normal heart sounds  No murmur heard  Pulmonary:      Effort: Pulmonary effort is normal       Breath sounds: Normal breath sounds  Abdominal:      General: Abdomen is flat  Bowel sounds are normal       Palpations: Abdomen is soft  There is no hepatomegaly or splenomegaly  Tenderness:  There is no " abdominal tenderness  Musculoskeletal:         General: Normal range of motion  Cervical back: Normal range of motion and neck supple  Skin:     General: Skin is warm and dry  Capillary Refill: Capillary refill takes less than 2 seconds  Findings: Rash (face) present  Neurological:      General: No focal deficit present  Mental Status: He is alert and oriented to person, place, and time  Psychiatric:         Mood and Affect: Mood normal          Behavior: Behavior normal          Thought Content:  Thought content normal          Judgment: Judgment normal           Collinsmorelia Haywood, [address] [id number]

## 2023-05-04 NOTE — ASSESSMENT & PLAN NOTE
Bp is elevated today  Pt reports lower home readings  He is under a lot of stress and his alcohol intake is high  We discussed reducing alcohol intake  Offered resources for [employer] but he is declining  He will continue with diet and exercise  No changes to medication regimen  F/U in 6 months

## 2023-05-04 NOTE — PROGRESS NOTES
Assessment/Plan:    Essential hypertension  Bp is elevated today  Pt reports lower home readings  He is under a lot of stress and his alcohol intake is high  We discussed reducing alcohol intake  Offered resources for [employer] but he is declining  He will continue with diet and exercise  No changes to medication regimen  F/U in 6 months  Impaired fasting glucose  FG is elevated but his A1C is excellent  He has been on prednisone and is currently weaning off  Mixed hyperlipidemia  TG higher  He is drinking at least 36 beers/week  We discussed alcohol reduction  He is willing to try this  Plan to recheck in 6 months  Diagnoses and all orders for this visit:    Annual physical exam    Essential hypertension    Mixed hyperlipidemia  -     Lipid panel; Future  -     Lipid panel    Impaired fasting glucose    Other orders  -     predniSONE 10 mg tablet; TAKE 2 TABLETS BY MOUTH DAILY FOR 2 WEEKS, THEN TAKE 1 TABLET BY MOUTH DAILY FO2 WEEKS, THEN STOP  -     doxycycline monohydrate (MONODOX) 100 mg capsule; Take 100 mg by mouth 2 (two) times a day with meals          Subjective:      Patient ID: Eric Bender is a 49 y o  male  Taking BP at home  Runs in 130s/80s  Stress level is up  Diet has been good  Exercisng 6 days per week an hour/day  Drinks at least 36 beers/week  He attributes this to his home stress life  He feels he needs to drink at least 2 beers at night to relax after work  Unfortunately this will lead to 5 or 6  He does not think he has a problem with drinking and has never tried to stop before  He continues to deal with derm and his facial rash  He is on doxycycline  He has been on prednisone and weaning off currently         The following portions of the patient's history were reviewed and updated as appropriate: allergies, current medications, past family history, past medical history, past social history, past surgical history and problem list     Review of Systems   Constitutional: Negative  HENT: Negative  Eyes: Negative  Respiratory: Negative  Cardiovascular: Negative  Gastrointestinal: Negative  Endocrine: Negative  Genitourinary: Negative  Musculoskeletal: Negative  Skin: Positive for rash  Neurological: Negative  Hematological: Negative  Psychiatric/Behavioral: Negative for agitation, behavioral problems, confusion, decreased concentration, dysphoric mood, hallucinations, self-injury, sleep disturbance and suicidal ideas  The patient is nervous/anxious  The patient is not hyperactive  Objective:  Vitals:    05/04/23 1248   BP: 144/88   Pulse: 94   Temp: 98 4 °F (36 9 °C)   SpO2: 97%      Physical Exam  Vitals reviewed  Constitutional:       Appearance: Normal appearance  He is well-developed  HENT:      Head: Normocephalic and atraumatic  Right Ear: Tympanic membrane, ear canal and external ear normal       Left Ear: Tympanic membrane, ear canal and external ear normal       Nose: Nose normal       Mouth/Throat:      Mouth: Mucous membranes are moist       Pharynx: Oropharynx is clear  No oropharyngeal exudate  Eyes:      Conjunctiva/sclera: Conjunctivae normal       Pupils: Pupils are equal, round, and reactive to light  Neck:      Thyroid: No thyromegaly  Vascular: No carotid bruit  Cardiovascular:      Rate and Rhythm: Normal rate and regular rhythm  Heart sounds: Normal heart sounds  No murmur heard  Pulmonary:      Effort: Pulmonary effort is normal       Breath sounds: Normal breath sounds  Abdominal:      General: Abdomen is protuberant  Bowel sounds are normal       Palpations: Abdomen is soft  There is no hepatomegaly or splenomegaly  Tenderness: There is no abdominal tenderness  Musculoskeletal:         General: Normal range of motion  Cervical back: Normal range of motion and neck supple  Lymphadenopathy:      Cervical: No cervical adenopathy     Skin:     General: Skin is warm and dry  Capillary Refill: Capillary refill takes less than 2 seconds  Findings: Rash (facial) present  Neurological:      Mental Status: He is alert and oriented to person, place, and time  Deep Tendon Reflexes: Reflexes normal    Psychiatric:         Mood and Affect: Mood normal          Behavior: Behavior normal          Thought Content:  Thought content normal          Judgment: Judgment normal

## 2023-05-04 NOTE — PATIENT INSTRUCTIONS
Wellness Visit for Adults   AMBULATORY CARE:   A wellness visit  is when you see your healthcare provider to get screened for health problems  Your healthcare provider will also give you advice on how to stay healthy  Write down your questions so you remember to ask them  Ask your healthcare provider how often you should have a wellness visit  What happens at a wellness visit:  Your healthcare provider will ask about your health, and your family history of health problems  This includes high blood pressure, heart disease, and cancer  He or she will ask if you have symptoms that concern you, if you smoke, and about your mood  You may also be asked about your intake of medicines, supplements, food, and alcohol  Any of the following may be done:   Your weight  will be checked  Your height may also be checked so your body mass index (BMI) can be calculated  Your BMI shows if you are at a healthy weight   Your blood pressure  and heart rate will be checked  Your temperature may also be checked   Blood and urine tests  may be done  Blood tests may be done to check your cholesterol levels  Abnormal cholesterol levels increase your risk for heart disease and stroke  You may also need a blood or urine test to check for diabetes if you are at increased risk  Urine tests may be done to look for signs of an infection or kidney disease   A physical exam  includes checking your heartbeat and lungs with a stethoscope  Your healthcare provider may also check your skin to look for sun damage   Screening tests  may be recommended  A screening test is done to check for diseases that may not cause symptoms  The screening tests you may need depend on your age, gender, family history, and lifestyle habits  For example, colorectal screening may be recommended if you are 48years old or older  Screening tests you need if you are a woman:    A Pap smear  is used to screen for cervical cancer   Pap smears are usually done every 3 to 5 years depending on your age  You may need them more often if you have had abnormal Pap smear test results in the past  Ask your healthcare provider how often you should have a Pap smear   A mammogram  is an x-ray of your breasts to screen for breast cancer  Experts recommend mammograms every 2 years starting at age 48 years  You may need a mammogram at age 52 years or younger if you have an increased risk for breast cancer  Talk to your healthcare provider about when you should start having mammograms and how often you need them  Vaccines you may need:    Get an influenza vaccine  every year  The influenza vaccine protects you from the flu  Several types of viruses cause the flu  The viruses change over time, so new vaccines are made each year   Get a tetanus-diphtheria (Td) booster vaccine  every 10 years  This vaccine protects you against tetanus and diphtheria  Tetanus is a severe infection that may cause painful muscle spasms and lockjaw  Diphtheria is a severe bacterial infection that causes a thick covering in the back of your mouth and throat   Get a human papillomavirus (HPV) vaccine  if you are female and aged 23 to 32 or male 23 to 24 and never received it  This vaccine protects you from HPV infection  HPV is the most common infection spread by sexual contact  HPV may also cause vaginal, penile, and anal cancers   Get a pneumococcal vaccine  if you are aged 72 years or older  The pneumococcal vaccine is an injection given to protect you from pneumococcal disease  Pneumococcal disease is an infection caused by pneumococcal bacteria  The infection may cause pneumonia, meningitis, or an ear infection   Get a shingles vaccine  if you are 61 or older, even if you have had shingles before  The shingles vaccine is an injection to protect you from the varicella-zoster virus  This is the same virus that causes chickenpox   Shingles is a painful rash that develops in people who had chickenpox or have been exposed to the virus  How to eat healthy:  My Plate is a model for planning healthy meals  It shows the types and amounts of foods that should go on your plate  Fruits and vegetables make up about half of your plate, and grains and protein make up the other half  A serving of dairy is included on the side of your plate  The amount of calories and serving sizes you need depends on your age, gender, weight, and height  Examples of healthy foods are listed below:   Eat a variety of vegetables  such as dark green, red, and orange vegetables  You can also include canned vegetables low in sodium (salt) and frozen vegetables without added butter or sauces   Eat a variety of fresh fruits , canned fruit in 100% juice, frozen fruit, and dried fruit   Include whole grains  At least half of the grains you eat should be whole grains  Examples include whole-wheat bread, wheat pasta, brown rice, and whole-grain cereals such as oatmeal      Eat a variety of protein foods such as seafood (fish and shellfish), lean meat, and poultry without skin (turkey and chicken)  Examples of lean meats include pork leg, shoulder, or tenderloin, and beef round, sirloin, tenderloin, and extra lean ground beef  Other protein foods include eggs and egg substitutes, beans, peas, soy products, nuts, and seeds   Choose low-fat dairy products such as skim or 1% milk or low-fat yogurt, cheese, and cottage cheese   Limit unhealthy fats  such as butter, hard margarine, and shortening  Exercise:  Exercise at least 30 minutes per day on most days of the week  Some examples of exercise include walking, biking, dancing, and swimming  You can also fit in more physical activity by taking the stairs instead of the elevator or parking farther away from stores  Include muscle strengthening activities 2 days each week  Regular exercise provides many health benefits   It helps you manage your weight, and decreases your risk for type 2 diabetes, heart disease, stroke, and high blood pressure  Exercise can also help improve your mood  Ask your healthcare provider about the best exercise plan for you  General health and safety guidelines:    Do not smoke  Nicotine and other chemicals in cigarettes and cigars can cause lung damage  Ask your healthcare provider for information if you currently smoke and need help to quit  E-cigarettes or smokeless tobacco still contain nicotine  Talk to your healthcare provider before you use these products   Limit alcohol  A drink of alcohol is 12 ounces of beer, 5 ounces of wine, or 1½ ounces of liquor   Lose weight, if needed  Being overweight increases your risk of certain health conditions  These include heart disease, high blood pressure, type 2 diabetes, and certain types of cancer   Protect your skin  Do not sunbathe or use tanning beds  Use sunscreen with a SPF 15 or higher  Apply sunscreen at least 15 minutes before you go outside  Reapply sunscreen every 2 hours  Wear protective clothing, hats, and sunglasses when you are outside   Drive safely  Always wear your seatbelt  Make sure everyone in your car wears a seatbelt  A seatbelt can save your life if you are in an accident  Do not use your cell phone when you are driving  This could distract you and cause an accident  Pull over if you need to make a call or send a text message   Practice safe sex  Use latex condoms if are sexually active and have more than one partner  Your healthcare provider may recommend screening tests for sexually transmitted infections (STIs)   Wear helmets, lifejackets, and protective gear  Always wear a helmet when you ride a bike or motorcycle, go skiing, or play sports that could cause a head injury  Wear protective equipment when you play sports  Wear a lifejacket when you are on a boat or doing water sports      © Copyright [employer] 2022 Information is for End User's use only and may not be sold, redistributed or otherwise used for commercial purposes  The above information is an  only  It is not intended as medical advice for individual conditions or treatments  Talk to your doctor, nurse or pharmacist before following any medical regimen to see if it is safe and effective for you  Obesity   AMBULATORY CARE:   Obesity  means your body mass index (BMI) is greater than 30  Your healthcare provider will use your age, height, and weight to measure your BMI  The risks of obesity include  many health problems, including injuries or physical disability   Diabetes (high blood sugar level)     High blood pressure or high cholesterol     Heart disease     Stroke     Gallbladder or liver disease     Cancer of the colon, breast, prostate, liver, or kidney     Sleep apnea     Arthritis or gout    Screening  is done to check for health conditions before you have signs or symptoms  If you are 28to 79years old, your blood sugar level may be checked every 3 years for signs of prediabetes or diabetes  Your healthcare provider will check your blood pressure at each visit  High blood pressure can lead to a stroke or other problems  Your provider may check for signs of heart disease, cancer, or other health problems  Seek care immediately if:    You have a severe headache, confusion, or difficulty speaking   You have weakness on one side of your body   You have chest pain, sweating, or shortness of breath  Call your doctor if:    You have symptoms of gallbladder or liver disease, such as pain in your upper abdomen   You have knee or hip pain and discomfort while walking   You have symptoms of diabetes, such as intense hunger and thirst, and frequent urination   You have symptoms of sleep apnea, such as snoring or daytime sleepiness   You have questions or concerns about your condition or care      Treatment for obesity  focuses on helping you lose weight to improve your health  Even a small decrease in BMI can reduce the risk for many health problems  Your healthcare provider will help you set a weight-loss goal    Lifestyle changes  are the first step in treating obesity  These include making healthy food choices and getting regular physical activity  Your healthcare provider may suggest a weight-loss program that involves coaching, education, and therapy   Medicine  may help you lose weight when it is used with a healthy foods and physical activity   Surgery  can help you lose weight if you have obesity along with other health problems  Several types of weight-loss surgery are available  Ask your healthcare provider for more information  Tips for safe weight loss:    Set small, realistic goals  An example of a small goal is to walk for 20 minutes 5 days a week  Anther goal is to lose 5% of your body weight   Ask for support  Tell friends, family members, and coworkers about your goals  Ask someone to lose weight with you  You may also want to join a weight-loss support group   Identify foods or triggers that may cause you to overeat  Remove tempting high-calorie foods from your home and workplace  Place a bowl of fresh fruit on your kitchen counter  If stress causes you to eat, find other ways to cope with stress  A counselor or therapist may be able to help you   Track your daily calories and activity  Write down what you eat and drink  Also write down how many minutes of physical activity you do each day   Track your weekly weight  Weigh yourself in the morning, before you eat or drink anything but after you use the bathroom  Use the same scale, in the same place, and in similar clothing each time  Only weigh yourself 1 to 2 times each week, or as directed  You may become discouraged if you weigh yourself every day  Eating changes:   You will need to eat 500 to 1,000 fewer calories each day than you currently eat to lose 1 to 2 pounds a week  The following changes will help you cut calories:   Eat smaller portions  Use small plates, no larger than 9 inches in diameter  Fill your plate half full of fruits and vegetables  Measure your food using measuring cups until you know what a serving size looks like   Eat 3 meals and 1 or 2 snacks each day  Plan your meals in advance  Colleen Carrasquillo and eat at home most of the time  Eat slowly  Do not skip meals  Skipping meals can lead to overeating later in the day  This can make it harder for you to lose weight  Talk with a dietitian to help you make a meal plan and schedule that is right for you   Eat fruits and vegetables at every meal   They are low in calories and high in fiber, which makes you feel full  Do not add butter, margarine, or cream sauce to vegetables  Use herbs to season steamed vegetables   Eat less fat and fewer fried foods  Eat more baked or grilled chicken and fish  These protein sources are lower in calories and fat than red meat  Limit fast food  Dress your salads with olive oil and vinegar instead of bottled dressing   Limit the amount of sugar you eat  Do not drink sugary beverages  Limit alcohol  Activity changes:  Physical activity is good for your body in many ways  It helps you burn calories and build strong muscles  It decreases stress and depression, and improves your mood  It can also help you sleep better  Talk to your healthcare provider before you begin an exercise program    Exercise for at least 30 minutes 5 days a week  Start slowly  Set aside time each day for physical activity that you enjoy and that is convenient for you  It is best to do both weight training and an activity that increases your heart rate, such as walking, bicycling, or swimming   Find ways to be more active  Do yard work and housecleaning  Walk up the stairs instead of using elevators   Spend your leisure time going to events that require walking, such as outdoor festivals or fairs  This extra physical activity can help you lose weight and keep it off  Follow up with your doctor as directed: You may need to meet with a dietitian  Write down your questions so you remember to ask them during your visits  © Copyright Sandhya Nuñez 2022 Information is for End User's use only and may not be sold, redistributed or otherwise used for commercial purposes  The above information is an  only  It is not intended as medical advice for individual conditions or treatments  Talk to your doctor, nurse or pharmacist before following any medical regimen to see if it is safe and effective for you

## 2023-09-12 ENCOUNTER — OFFICE VISIT (OUTPATIENT)
Dept: SLEEP CENTER | Facility: HOSPITAL | Age: 48
End: 2023-09-12
Payer: COMMERCIAL

## 2023-09-12 VITALS
HEIGHT: 70 IN | BODY MASS INDEX: 45.1 KG/M2 | DIASTOLIC BLOOD PRESSURE: 82 MMHG | SYSTOLIC BLOOD PRESSURE: 140 MMHG | WEIGHT: 315 LBS

## 2023-09-12 DIAGNOSIS — G47.33 OSA (OBSTRUCTIVE SLEEP APNEA): Primary | ICD-10-CM

## 2023-09-12 DIAGNOSIS — R68.2 DRY MOUTH: ICD-10-CM

## 2023-09-12 DIAGNOSIS — R40.0 DAYTIME SLEEPINESS: ICD-10-CM

## 2023-09-12 DIAGNOSIS — I10 ESSENTIAL HYPERTENSION: ICD-10-CM

## 2023-09-12 DIAGNOSIS — F51.12 INSUFFICIENT SLEEP SYNDROME: ICD-10-CM

## 2023-09-12 DIAGNOSIS — E66.01 MORBID OBESITY WITH BMI OF 40.0-44.9, ADULT (HCC): ICD-10-CM

## 2023-09-12 PROCEDURE — 99214 OFFICE O/P EST MOD 30 MIN: CPT | Performed by: INTERNAL MEDICINE

## 2023-09-12 NOTE — PROGRESS NOTES
Follow[facility]Up Note - [facility]   Preet Rivera  49 y.o. male  :1975  KOJ:[id number]  DOS:2023    CC: I saw this patient for follow-up in clinic today for Sleep disordered breathing, Coexisting Sleep and Medical Problems. Interval changes: None reported. A home sleep study in 2020 demonstrated respiratory event index of 87.8 hour. Minimal oxygen saturation was 63% and 71.2% of the study was spent with saturations below 90%. A small index was 60.9%. During a subsequent therapeutic study, sleep disordered breathing was successfully remediated with BiPAP at 21/17 cm H2O. PFSH, Problem List, Medications & Allergies were reviewed in EMR. He  has a past medical history of Hyperkalemia (2020) and Hypertension. He has a current medication list which includes the following prescription(s): doxycycline monohydrate, hydrochlorothiazide, lisinopril, and prednisone. PHYSIOLOGICAL DATA REVIEW : Using PAP > 4 hours/night 83%. Estimated TAMIE 1.2/hour with pressure of 21/17cm [email or web address] percentile; patient has not been using non FDA approved devices to sanitize the machine. INTERPRETATION: Compliance is Very good; Pressure setting is:optimal; ;   SUBJECTIVE: With respect to use of PAP, Nicolas  is experiencing minimal adverse effects:dry mouth/throat. He derives benefit. Is satisfied with sleep and daytime function. Sleep Routine: Josiah Navarro reports getting 7 hrs sleep; he has no difficulty initiating or maintaining sleep . He arises before alarm most days and feels more refreshed since on Rx. Josiah aNvarro denies excessive daytime sleepiness,. He rated [himself] at Total score: 5 /24 on the [place] Sleepiness Scale. Other issues: None reported. Habits:[ reports that he has never smoked. He has never used smokeless tobacco.], [ reports current alcohol use.], [ reports no history of drug use.], Caffeine use:limited; Exercise routine: regular.       ROS: Significant for weight fluctuates in the range of a few pounds. He states efforts at weight reduction have not been successful. A 10 point review of systems was otherwise negative. Ace Presley EXAM: /82   Ht 5' 10" (1.778 m)   Wt (!) 143 kg (316 lb)   BMI 45.34 kg/m²     Wt Readings from Last 3 Encounters:   09/12/23 (!) 143 kg (316 lb)   05/04/23 (!) 141 kg (311 lb 9.6 oz)   12/27/22 (!) 141 kg (309 lb 12.8 oz)      Patient is well groomed; well appearing. CNS: Alert, orientated, speech clear & coherent  Psych: cooperative and in no distress. Mental state: Appears normal.  H&N: EOMI; NC/AT: No facial pressure marks, no rashes. Skin/Extrem: col & hydration normal; no edema  Resp: Respiratory effort is normal  Physical findings otherwise essentially unchanged from previous. IMPRESSION: Problem List Items & Comorbidities Addressed this Visit    1. ANTWAN (obstructive sleep apnea)  PAP DME Resupply/Reorder      2. Insufficient sleep syndrome        3. Daytime sleepiness        4. Dry mouth        5. Essential hypertension        6. Morbid obesity with BMI of 40.0-44.9, adult ([address])        1-3 improved    PLAN:  1. I reviewed results of prior studies and physiologic data with the patient. 2. I discussed treatment options with risks and benefits. 3. Treatment with  PAP is medically necessary and Juan David Олег is agreable to continue use. 4. Care of equipment, methods to improve comfort using PAP and importance of compliance with therapy were discussed. 5. Pressure setting: continue 21/17 cmH2O.    6. Rx provided to replace supplies and Care coordinated with DME provider. 7. Discussed strategies for weight reduction. I offered referral to weight management program but he declined for now. He will discuss further with PCP. 8. Follow-up is advised in 1 year or sooner if needed to monitor progress, compliance and to adjust therapy. Thank you for allowing me to participate in the care of this patient.     Sincerely,     Authenticated electronically on 58/08/21   Board Certified Specialist     Portions of the record may have been created with voice recognition software. Occasional wrong word or "sound a like" substitutions may have occurred due to the inherent limitations of voice recognition software. There may also be notations and random deletions of words or characters from malfunctioning software. Read the chart carefully and recognize, using context, where substitutions/deletions have occurred.

## 2023-09-12 NOTE — PATIENT INSTRUCTIONS

## 2023-09-13 ENCOUNTER — TELEPHONE (OUTPATIENT)
Dept: SLEEP CENTER | Facility: CLINIC | Age: 48
End: 2023-09-13

## 2023-09-13 LAB

## 2023-09-26 ENCOUNTER — TELEPHONE (OUTPATIENT)
Dept: SLEEP CENTER | Facility: CLINIC | Age: 48
End: 2023-09-26

## 2023-09-26 NOTE — TELEPHONE ENCOUNTER
Received CMN form from [employer] via fax for CPAP supplies. Once form completed, needs to be faxed back to [address] [place] [address].     Form placed in Dr. Denzel Rodriguez in [facility] office.

## 2023-11-07 ENCOUNTER — OFFICE VISIT (OUTPATIENT)
Dept: FAMILY MEDICINE CLINIC | Facility: HOSPITAL | Age: 48
End: 2023-11-07
Payer: COMMERCIAL

## 2023-11-07 VITALS
OXYGEN SATURATION: 97 % | HEART RATE: 80 BPM | WEIGHT: 315 LBS | DIASTOLIC BLOOD PRESSURE: 88 MMHG | TEMPERATURE: 98.3 F | HEIGHT: 70 IN | BODY MASS INDEX: 45.1 KG/M2 | SYSTOLIC BLOOD PRESSURE: 148 MMHG

## 2023-11-07 DIAGNOSIS — M20.42 HAMMER TOES OF BOTH FEET: ICD-10-CM

## 2023-11-07 DIAGNOSIS — E78.2 MIXED HYPERLIPIDEMIA: ICD-10-CM

## 2023-11-07 DIAGNOSIS — M20.41 HAMMER TOES OF BOTH FEET: ICD-10-CM

## 2023-11-07 DIAGNOSIS — F41.9 ANXIETY: ICD-10-CM

## 2023-11-07 DIAGNOSIS — R73.01 IMPAIRED FASTING GLUCOSE: ICD-10-CM

## 2023-11-07 DIAGNOSIS — I10 ESSENTIAL HYPERTENSION: Primary | ICD-10-CM

## 2023-11-07 DIAGNOSIS — Z12.11 SCREEN FOR COLON CANCER: ICD-10-CM

## 2023-11-07 PROCEDURE — 99214 OFFICE O/P EST MOD 30 MIN: CPT | Performed by: NURSE PRACTITIONER

## 2023-11-07 RX ORDER — LISINOPRIL 40 MG/1
40 TABLET ORAL DAILY
Qty: 30 TABLET | Refills: 1 | Status: SHIPPED | OUTPATIENT
Start: 2023-11-07

## 2023-11-07 RX ORDER — ESCITALOPRAM OXALATE 10 MG/1
10 TABLET ORAL DAILY
Qty: 30 TABLET | Refills: 1 | Status: SHIPPED | OUTPATIENT
Start: 2023-11-07

## 2023-11-07 RX ORDER — HYDROCHLOROTHIAZIDE 25 MG/1
25 TABLET ORAL DAILY
Qty: 90 TABLET | Refills: 1 | Status: SHIPPED | OUTPATIENT
Start: 2023-11-07

## 2023-11-07 NOTE — PROGRESS NOTES
Assessment/Plan:    Essential hypertension  BP is elevated and he reports high home readings. Increase lisinopril to 40 mg. Continue with 25 mg HCTZ. We again discussed decreasing his alcohol intake. Stress is also a contributing factor. F/U in 4 weeks for BP check. Anxiety  He feels his alcohol intake is directly correlated to his anxiety/stress level. He does not feel he has a problem but cannot unwind w/o alcohol. He is willing to try something to help his anxiety. Will start lexapro. AE discussed. He is unsure when he will start this. He is aware when he does he will need a f/u 4 weeks from start. Mixed hyperlipidemia  He did not have blood work done. Advised he get this done before f/u in 4 weeks. Diagnoses and all orders for this visit:    Essential hypertension  -     lisinopril (ZESTRIL) 40 mg tablet; Take 1 tablet (40 mg total) by mouth daily  -     hydrochlorothiazide (HYDRODIURIL) 25 mg tablet; Take 1 tablet (25 mg total) by mouth daily    Mixed hyperlipidemia    Impaired fasting glucose    Anxiety  -     escitalopram (Lexapro) 10 mg tablet; Take 1 tablet (10 mg total) by mouth daily    Screen for colon cancer  -     Ambulatory Referral to Gastroenterology; Future    Hammer toes of both feet  -     Ambulatory Referral to Podiatry; Future          Subjective:      Patient ID: Kaylynn Sheets is a 51 y.o. male. Exercising but not losing weight. Thinks he eats and drinks too much. Pain in both feet. Toe is arched up. Sometimes on bottom of foot. Just drinks beer about 6 3-4 times/week. Does not feel it is a problem. Needs alcohol to relax. Life is very stressful. BP has been running high. Typically 140s.          The following portions of the patient's history were reviewed and updated as appropriate: allergies, current medications, past family history, past medical history, past social history, past surgical history and problem list.    Review of Systems   Constitutional: Negative for unexpected weight change. Eyes:  Negative for visual disturbance. Respiratory:  Negative for shortness of breath. Cardiovascular:  Negative for chest pain, palpitations and leg swelling. Musculoskeletal:  Positive for arthralgias. Negative for myalgias. Neurological:  Negative for dizziness, weakness, light-headedness, numbness and headaches. Psychiatric/Behavioral:  The patient is nervous/anxious. Objective:  Vitals:    11/07/23 1255   BP: 148/88   Pulse: 80   Temp: 98.3 °F (36.8 °C)   SpO2: 97%      Physical Exam  Vitals reviewed. Constitutional:       Appearance: Normal appearance. He is well-developed. He is obese. Neck:      Vascular: No carotid bruit. Cardiovascular:      Rate and Rhythm: Normal rate and regular rhythm. Pulses:           Carotid pulses are 2+ on the right side and 2+ on the left side. Heart sounds: Normal heart sounds. No murmur heard. Pulmonary:      Effort: Pulmonary effort is normal.      Breath sounds: Normal breath sounds. Musculoskeletal:      Right foot: Deformity (2nd toe-hammer) present. Left foot: Deformity (2nd toe-hammer) present. Skin:     General: Skin is warm and dry. Neurological:      Mental Status: He is alert and oriented to person, place, and time. Psychiatric:         Mood and Affect: Mood normal.         Behavior: Behavior normal.         Thought Content:  Thought content normal.         Judgment: Judgment normal.

## 2023-11-07 NOTE — ASSESSMENT & PLAN NOTE
BP is elevated and he reports high home readings. Increase lisinopril to 40 mg. Continue with 25 mg HCTZ. We again discussed decreasing his alcohol intake. Stress is also a contributing factor. F/U in 4 weeks for BP check.

## 2023-11-07 NOTE — ASSESSMENT & PLAN NOTE
He feels his alcohol intake is directly correlated to his anxiety/stress level. He does not feel he has a problem but cannot unwind w/o alcohol. He is willing to try something to help his anxiety. Will start lexapro. AE discussed. He is unsure when he will start this. He is aware when he does he will need a f/u 4 weeks from start.

## 2023-12-01 ENCOUNTER — PREP FOR PROCEDURE (OUTPATIENT)
Age: 48
End: 2023-12-01

## 2023-12-01 ENCOUNTER — TELEPHONE (OUTPATIENT)
Age: 48
End: 2023-12-01

## 2023-12-01 DIAGNOSIS — Z12.11 SCREENING FOR COLON CANCER: Primary | ICD-10-CM

## 2023-12-01 NOTE — TELEPHONE ENCOUNTER
12/01/23  Screened by: Pete Jaimes    Referring Provider Ronna Sam    Pre- Screening: There is no height or weight on file to calculate BMI. Has patient been referred for a routine screening Colonoscopy? yes  Is the patient between 43-73 years old? yes      Previous Colonoscopy no   If yes:    Date:     Facility:     Reason:       SCHEDULING STAFF: If the patient is between 45yrs-49yrs, please advise patient to confirm benefits/coverage with their insurance company for a routine screening colonoscopy, some insurance carriers will only cover at [address] [place] [address] or older. If the patient is over 66years old, please schedule an office visit. Does the patient want to see a Gastroenterologist prior to their procedure OR are they having any GI symptoms? no    Has the patient been hospitalized or had abdominal surgery in the past 6 months? no    Does the patient use supplemental oxygen? no    Does the patient take Coumadin, Lovenox, Plavix, Elliquis, Xarelto, or other blood thinning medication? no    Has the patient had a stroke, cardiac event, or stent placed in the past year? no    PT PASSED OA    SCHEDULING STAFF: If patient answers NO to above questions, then schedule procedure. If patient answers YES to above questions, then schedule office appointment. If patient is between 45yrs - 49yrs, please advise patient that we will have to confirm benefits & coverage with their insurance company for a routine screening colonoscopy.

## 2023-12-01 NOTE — TELEPHONE ENCOUNTER
Scheduled date of colonoscopy (as of today):2/1/24  Physician performing colonoscopy:DR. STARKS  Location of colonoscopy:[facility]  Bowel prep reviewed with patient:LAINE/ROS SENT TO PTS MYCHART  Instructions reviewed with patient by:ESE  Clearances: NA

## 2023-12-15 LAB
CHOLEST SERPL-MCNC: 183 MG/DL (ref 100–199)
CHOLEST/HDLC SERPL: 4.9 RATIO (ref 0–5)
HDLC SERPL-MCNC: 37 MG/DL
LDLC SERPL CALC-MCNC: 104 MG/DL (ref 0–99)
SL AMB VLDL CHOLESTEROL CALC: 42 MG/DL (ref 5–40)
TRIGL SERPL-MCNC: 242 MG/DL (ref 0–149)

## 2023-12-19 ENCOUNTER — OFFICE VISIT (OUTPATIENT)
Dept: FAMILY MEDICINE CLINIC | Facility: HOSPITAL | Age: 48
End: 2023-12-19
Payer: COMMERCIAL

## 2023-12-19 VITALS
HEART RATE: 72 BPM | OXYGEN SATURATION: 98 % | BODY MASS INDEX: 45.1 KG/M2 | DIASTOLIC BLOOD PRESSURE: 86 MMHG | WEIGHT: 315 LBS | TEMPERATURE: 98.1 F | SYSTOLIC BLOOD PRESSURE: 146 MMHG | HEIGHT: 70 IN

## 2023-12-19 DIAGNOSIS — E78.2 MIXED HYPERLIPIDEMIA: ICD-10-CM

## 2023-12-19 DIAGNOSIS — F41.9 ANXIETY: ICD-10-CM

## 2023-12-19 DIAGNOSIS — R73.01 IMPAIRED FASTING GLUCOSE: ICD-10-CM

## 2023-12-19 DIAGNOSIS — I10 ESSENTIAL HYPERTENSION: Primary | ICD-10-CM

## 2023-12-19 PROCEDURE — 99214 OFFICE O/P EST MOD 30 MIN: CPT | Performed by: NURSE PRACTITIONER

## 2023-12-19 RX ORDER — CLINDAMYCIN PHOSPHATE 10 UG/ML
LOTION TOPICAL
COMMUNITY
Start: 2023-09-11

## 2023-12-19 RX ORDER — AMLODIPINE BESYLATE 5 MG/1
5 TABLET ORAL DAILY
Qty: 30 TABLET | Refills: 1 | Status: SHIPPED | OUTPATIENT
Start: 2023-12-19

## 2023-12-19 RX ORDER — ESCITALOPRAM OXALATE 20 MG/1
20 TABLET ORAL DAILY
Qty: 30 TABLET | Refills: 1 | Status: SHIPPED | OUTPATIENT
Start: 2023-12-19

## 2023-12-19 NOTE — ASSESSMENT & PLAN NOTE
BP is elevated.   Increased stress and anxiety contributing.   We had another discussion about his alcohol intake as this I feel is a large contributor.   Add in amlodipine.   If BP is not responsive will proceed with secondary HTN w/u.   F/U in 4 weeks.

## 2023-12-19 NOTE — PROGRESS NOTES
Assessment/Plan:    Essential hypertension  BP is elevated.   Increased stress and anxiety contributing.   We had another discussion about his alcohol intake as this I feel is a large contributor.   Add in amlodipine.   If BP is not responsive will proceed with secondary HTN w/u.   F/U in 4 weeks.     Mixed hyperlipidemia  LDL is improved. TG are higher. Discussed role alcohol has on this.     Anxiety  Girlfriend noted some improvement in his mood. He has not noticed much of a difference.   Will increase lexapro to 20 mg.   F/U in 4 weeks.        Diagnoses and all orders for this visit:    Essential hypertension  -     CBC and differential; Future  -     Comprehensive metabolic panel; Future  -     amLODIPine (NORVASC) 5 mg tablet; Take 1 tablet (5 mg total) by mouth daily  -     CBC and differential  -     Comprehensive metabolic panel    Mixed hyperlipidemia  -     CBC and differential; Future  -     Comprehensive metabolic panel; Future  -     Lipid panel; Future  -     CBC and differential  -     Comprehensive metabolic panel  -     Lipid panel    Anxiety  -     escitalopram (Lexapro) 20 mg tablet; Take 1 tablet (20 mg total) by mouth daily    Impaired fasting glucose  -     Hemoglobin A1C; Future  -     Hemoglobin A1C    Other orders  -     clindamycin (CLEOCIN T) 1 % lotion; APPLY TOPICALLY TWICE A DAY TO THE FACE          Subjective:      Patient ID: Nicolas Mari is a 48 y.o. male.    Not exercising due to foot pain. Sees podiatry next week.   Continues to drink alcohol. Drinks about 12 + beers/week. Reports diet is not bad. Sleeps well with Bipap.   His girlfriend noticed an improvement in his mood since starting Lexapro. He has not noticed any difference.         The following portions of the patient's history were reviewed and updated as appropriate: allergies, current medications, past family history, past medical history, past social history, past surgical history and problem list.    Review of  Systems   Constitutional:  Negative for unexpected weight change.   Eyes:  Negative for visual disturbance.   Respiratory:  Negative for shortness of breath.    Cardiovascular:  Negative for chest pain, palpitations and leg swelling.   Neurological:  Negative for dizziness, weakness, light-headedness, numbness and headaches.   Psychiatric/Behavioral:  The patient is nervous/anxious.          Objective:  Vitals:    12/19/23 1549   BP: 146/86   Pulse: 72   Temp: 98.1 °F (36.7 °C)   SpO2: 98%      Physical Exam  Vitals reviewed.   Constitutional:       Appearance: Normal appearance. He is obese.   Cardiovascular:      Rate and Rhythm: Normal rate and regular rhythm.      Heart sounds: Normal heart sounds. No murmur heard.  Pulmonary:      Effort: Pulmonary effort is normal.      Breath sounds: Normal breath sounds.   Skin:     General: Skin is warm and dry.   Neurological:      Mental Status: He is alert and oriented to person, place, and time.   Psychiatric:         Mood and Affect: Mood normal.         Behavior: Behavior normal.         Thought Content: Thought content normal.         Judgment: Judgment normal.

## 2023-12-19 NOTE — ASSESSMENT & PLAN NOTE
Girlfriend noted some improvement in his mood. He has not noticed much of a difference.   Will increase lexapro to 20 mg.   F/U in 4 weeks.

## 2023-12-26 NOTE — PROGRESS NOTES
Patient ID: Nicolas Mari is a 48 y.o. male Date of Birth 1975       Chief Complaint   Patient presents with    Hammer Toe     Bilateral; more on left      Foot Pain     General pain in feet             Diagnosis:  1. Hammertoes of both feet    2. Hammer toes of both feet  -     Ambulatory Referral to Podiatry      Initial pedal examination with socks and shoes removed.  Today we discussed the biomechanics, etiology and treatment options of hammertoes and pain in the ball of foot  Today we discussed conservative versus surgical treatment for his hammertoes, patient will start off with wide deep toe box shoes to accommodate his pes cavus foot type and hammertoes, he was referred to the [facility] in.  Patient with metatarsalgia second MTPJ bilateral secondary to dorsally dislocated hammertoes, I have recommended over-the-counter power step [place] with metatarsal pad inserts, this information was placed in his after visit summary.  Today we discussed surgical correction of hammertoe with fusion of second PIPJ bilateral, shortening osteotomy of second metatarsal, we discussed surgical procedure, postoperative course, immobilization, minimal to nonweightbearing for the first 2 weeks, explained he may have residual edema for 6 months to a year secondary to large amount of surgery and a small space.  At this time patient defers any surgical intervention would like to proceed with conservative care, he will follow-up as needed.          Subjective:   Nicolas presents today for evaluation and care of bilateral foot pain upon referral from PCP. He states he has pain in both feet. Toe is arched up. Sometimes on bottom of foot he feels like he is walking on a marble.        The following portions of the patient's history were reviewed and updated as appropriate:     Past Medical History:   Diagnosis Date    Anxiety 11/7/2023    Hyperkalemia 12/2/2020    Hypertension        History reviewed. No pertinent surgical  history.    Social History     Socioeconomic History    Marital status: Single     Spouse name: None    Number of children: None    Years of education: None    Highest education level: None   Occupational History    None   Tobacco Use    Smoking status: Never    Smokeless tobacco: Never   Vaping Use    Vaping status: Never Used   Substance and Sexual Activity    Alcohol use: Yes    Drug use: Never    Sexual activity: Yes   Other Topics Concern    None   Social History Narrative    None     Social Determinants of Health     Financial Resource Strain: Not on file   Food Insecurity: Not on file   Transportation Needs: Not on file   Physical Activity: Not on file   Stress: Not on file   Social Connections: Not on file   Intimate Partner Violence: Not on file   Housing Stability: Not on file          Current Outpatient Medications:     amLODIPine (NORVASC) 5 mg tablet, Take 1 tablet (5 mg total) by mouth daily, Disp: 30 tablet, Rfl: 1    doxycycline monohydrate (MONODOX) 100 mg capsule, Take 100 mg by mouth 2 (two) times a day with meals Once or twice a week as instructed by physician, Disp: , Rfl:     escitalopram (Lexapro) 20 mg tablet, Take 1 tablet (20 mg total) by mouth daily, Disp: 30 tablet, Rfl: 1    hydrochlorothiazide (HYDRODIURIL) 25 mg tablet, Take 1 tablet (25 mg total) by mouth daily, Disp: 90 tablet, Rfl: 1    lisinopril (ZESTRIL) 40 mg tablet, Take 1 tablet (40 mg total) by mouth daily, Disp: 30 tablet, Rfl: 1    predniSONE 10 mg tablet, Once or twice a week as instructed by physician, Disp: , Rfl:     clindamycin (CLEOCIN T) 1 % lotion, APPLY TOPICALLY TWICE A DAY TO THE FACE, Disp: , Rfl:     Allergies  Patient has no known allergies.    Family History   Problem Relation Age of Onset    Hypertension Mother     Coronary artery disease Father     Heart disease Father     Lupus Father     Diabetes Father     Rheum arthritis Sister     Colon cancer Paternal Grandmother            Objective:  /99 (BP  "Location: Left arm, Patient Position: Sitting, Cuff Size: Adult)   Pulse 93   Ht 5' 10\" (1.778 m)   Wt (!) 144 kg (317 lb)   BMI 45.48 kg/m²     Review of Systems   Constitutional:  Negative for chills and fever.   HENT:  Negative for ear pain and sore throat.    Eyes:  Negative for pain and visual disturbance.   Respiratory:  Negative for cough and shortness of breath.    Cardiovascular:  Negative for chest pain and palpitations.   Gastrointestinal:  Negative for abdominal pain and vomiting.   Genitourinary:  Negative for dysuria and hematuria.   Musculoskeletal:  Negative for arthralgias and back pain.        Painful hammertoes both feet   Skin:  Negative for color change and rash.   Neurological:  Negative for seizures and syncope.   All other systems reviewed and are negative.      Physical Exam  Constitutional:       Appearance: Normal appearance. He is obese.   HENT:      Head: Normocephalic and atraumatic.      Right Ear: External ear normal.      Left Ear: External ear normal.      Nose: Nose normal.      Mouth/Throat:      Mouth: Mucous membranes are moist.      Pharynx: Oropharynx is clear.   Eyes:      Conjunctiva/sclera: Conjunctivae normal.   Cardiovascular:      Pulses: Normal pulses.           Dorsalis pedis pulses are 2+ on the right side and 2+ on the left side.        Posterior tibial pulses are 2+ on the right side and 2+ on the left side.   Pulmonary:      Effort: Pulmonary effort is normal.   Musculoskeletal:      Cervical back: Normal range of motion.      Right lower leg: No edema.      Left lower leg: No edema.      Right foot: Deformity present.      Left foot: Deformity present.   Feet:      Right foot:      Protective Sensation: 10 sites tested.  10 sites sensed.      Skin integrity: Skin integrity normal.      Toenail Condition: Right toenails are normal.      Left foot:      Protective Sensation: 10 sites tested.  10 sites sensed.      Skin integrity: Skin integrity normal.      " "Toenail Condition: Left toenails are normal.      Comments: Dorsally dislocated second MTPJ bilateral with rigid hammertoe, fat pad atrophy submetatarsal head 1, hallux limitus bilateral, pes cavus foot type bilateral, ankle equinus bilateral.  MMT is 5 out of 5 bilateral  Active and passive range of motion is pain-free and within normal limits.  Neurological:      Mental Status: He is alert. Mental status is at baseline.   Psychiatric:         Mood and Affect: Mood normal.         Behavior: Behavior normal.              No pertinent results found.      Arleth Juarez, MATTHEW, DPM, FACFAS    Portions of the record may have been created with voice recognition software. Occasional wrong word or \"sound a like\" substitutions may have occurred due to the inherent limitations of voice recognition software. Read the chart carefully and recognize, using context, where substitutions have occurred.  "

## 2023-12-29 ENCOUNTER — OFFICE VISIT (OUTPATIENT)
Dept: PODIATRY | Facility: CLINIC | Age: 48
End: 2023-12-29
Payer: COMMERCIAL

## 2023-12-29 VITALS
BODY MASS INDEX: 45.1 KG/M2 | DIASTOLIC BLOOD PRESSURE: 99 MMHG | HEART RATE: 93 BPM | SYSTOLIC BLOOD PRESSURE: 167 MMHG | HEIGHT: 70 IN | WEIGHT: 315 LBS

## 2023-12-29 DIAGNOSIS — M20.42 HAMMERTOES OF BOTH FEET: Primary | ICD-10-CM

## 2023-12-29 DIAGNOSIS — M20.42 HAMMER TOES OF BOTH FEET: ICD-10-CM

## 2023-12-29 DIAGNOSIS — M20.41 HAMMERTOES OF BOTH FEET: Primary | ICD-10-CM

## 2023-12-29 DIAGNOSIS — M20.41 HAMMER TOES OF BOTH FEET: ICD-10-CM

## 2023-12-29 PROCEDURE — 99203 OFFICE O/P NEW LOW 30 MIN: CPT | Performed by: PODIATRIST

## 2024-01-02 DIAGNOSIS — I10 ESSENTIAL HYPERTENSION: ICD-10-CM

## 2024-01-02 RX ORDER — LISINOPRIL 40 MG/1
40 TABLET ORAL DAILY
Qty: 30 TABLET | Refills: 0 | Status: SHIPPED | OUTPATIENT
Start: 2024-01-02

## 2024-01-26 ENCOUNTER — TELEPHONE (OUTPATIENT)
Dept: GASTROENTEROLOGY | Facility: CLINIC | Age: 49
End: 2024-01-26

## 2024-01-26 NOTE — TELEPHONE ENCOUNTER
Procedure confirmed  Colonoscopy     Via: Spoke with patient.      Instructions given: Bonny  Pt will review and call with any questions.    Prep Given: Miralax/Dulcolax    Call the office if there are any questions.

## 2024-01-30 ENCOUNTER — OFFICE VISIT (OUTPATIENT)
Dept: FAMILY MEDICINE CLINIC | Facility: HOSPITAL | Age: 49
End: 2024-01-30
Payer: COMMERCIAL

## 2024-01-30 VITALS
HEART RATE: 84 BPM | WEIGHT: 315 LBS | DIASTOLIC BLOOD PRESSURE: 90 MMHG | OXYGEN SATURATION: 97 % | BODY MASS INDEX: 45.1 KG/M2 | HEIGHT: 70 IN | TEMPERATURE: 97 F | SYSTOLIC BLOOD PRESSURE: 154 MMHG

## 2024-01-30 DIAGNOSIS — I10 UNCONTROLLED HYPERTENSION: Primary | ICD-10-CM

## 2024-01-30 DIAGNOSIS — F41.9 ANXIETY: ICD-10-CM

## 2024-01-30 DIAGNOSIS — I10 ESSENTIAL HYPERTENSION: ICD-10-CM

## 2024-01-30 PROCEDURE — 3080F DIAST BP >= 90 MM HG: CPT | Performed by: NURSE PRACTITIONER

## 2024-01-30 PROCEDURE — 3077F SYST BP >= 140 MM HG: CPT | Performed by: NURSE PRACTITIONER

## 2024-01-30 PROCEDURE — 99214 OFFICE O/P EST MOD 30 MIN: CPT | Performed by: NURSE PRACTITIONER

## 2024-01-30 RX ORDER — AMLODIPINE BESYLATE 10 MG/1
10 TABLET ORAL DAILY
Qty: 30 TABLET | Refills: 1 | Status: SHIPPED | OUTPATIENT
Start: 2024-01-30

## 2024-01-30 RX ORDER — BUSPIRONE HYDROCHLORIDE 5 MG/1
5 TABLET ORAL 2 TIMES DAILY
Qty: 60 TABLET | Refills: 1 | Status: SHIPPED | OUTPATIENT
Start: 2024-01-30

## 2024-01-30 NOTE — PROGRESS NOTES
Assessment/Plan:    Uncontrolled hypertension  BP remains uncontrolled with minimal improvement with 3 agents.   Will order labs and renal duplex to r/o secondary cause to HTN.   He does continue to drink excessive alcohol and continues to gain weight so this may be the issue.   Also with significant amount of stress.   Will trial Buspar to help with anxiety.   Increase amlodipine to 10 mg.   F/U in 4 weeks.     Anxiety  Minimal control with 20 mg Lexapro.   Will add in Buspar.   F/U in 4 weeks.        Diagnoses and all orders for this visit:    Uncontrolled hypertension  -     VAS renal artery complete; Future  -     Catecholamines, Fractionated, Urinary Fr; Future  -     Catecholamines, fractionated, plasma; Future  -     Aldosterone/Renin Ratio; Future  -     Cortisol Level, AM Specimen; Future  -     Comprehensive metabolic panel; Future  -     Catecholamines, Fractionated, Urinary Fr  -     Catecholamines, fractionated, plasma  -     Aldosterone/Renin Ratio  -     Cortisol Level, AM Specimen  -     Comprehensive metabolic panel    Essential hypertension  -     amLODIPine (NORVASC) 10 mg tablet; Take 1 tablet (10 mg total) by mouth daily    Anxiety  -     busPIRone (BUSPAR) 5 mg tablet; Take 1 tablet (5 mg total) by mouth 2 (two) times a day          Subjective:      Patient ID: Nicolas Mari is a 48 y.o. male.    Feels diet is ok. Drinks a lot of alcohol. Drinks 10-20 beers/week. Can't exercise due to hammer toe. Saw podiatry and just ordered shoes. Has been gaining weight. Increased stress.He is on prednisone twice/week for rosacea.   Lexapro has helped a little. Gets agitated. Feels more depressed than anxious.          The following portions of the patient's history were reviewed and updated as appropriate: allergies, current medications, past family history, past medical history, past social history, past surgical history and problem list.    Review of Systems   Constitutional:  Negative for unexpected  weight change.   Eyes:  Negative for visual disturbance.   Respiratory:  Negative for shortness of breath.    Cardiovascular:  Negative for chest pain, palpitations and leg swelling.   Neurological:  Negative for dizziness, weakness, light-headedness, numbness and headaches.   Psychiatric/Behavioral:  Positive for dysphoric mood. The patient is nervous/anxious.          Objective:  Vitals:    01/30/24 1335   BP: 154/90   Pulse: 84   Temp: (!) 97 °F (36.1 °C)   SpO2: 97%      Physical Exam  Vitals reviewed.   Constitutional:       Appearance: Normal appearance. He is well-developed. He is obese.   Cardiovascular:      Rate and Rhythm: Normal rate and regular rhythm.      Pulses:           Carotid pulses are 2+ on the right side and 2+ on the left side.     Heart sounds: Normal heart sounds. No murmur heard.  Pulmonary:      Effort: Pulmonary effort is normal.      Breath sounds: Normal breath sounds.   Skin:     General: Skin is warm and dry.   Neurological:      Mental Status: He is alert and oriented to person, place, and time.   Psychiatric:         Mood and Affect: Mood normal.         Behavior: Behavior normal.         Thought Content: Thought content normal.         Judgment: Judgment normal.

## 2024-01-30 NOTE — ASSESSMENT & PLAN NOTE
BP remains uncontrolled with minimal improvement with 3 agents.   Will order labs and renal duplex to r/o secondary cause to HTN.   He does continue to drink excessive alcohol and continues to gain weight so this may be the issue.   Also with significant amount of stress.   Will trial Buspar to help with anxiety.   Increase amlodipine to 10 mg.   F/U in 4 weeks.

## 2024-02-01 ENCOUNTER — HOSPITAL ENCOUNTER (OUTPATIENT)
Dept: GASTROENTEROLOGY | Facility: HOSPITAL | Age: 49
Setting detail: OUTPATIENT SURGERY
End: 2024-02-01
Attending: INTERNAL MEDICINE
Payer: COMMERCIAL

## 2024-02-01 ENCOUNTER — ANESTHESIA EVENT (OUTPATIENT)
Dept: GASTROENTEROLOGY | Facility: HOSPITAL | Age: 49
End: 2024-02-01

## 2024-02-01 ENCOUNTER — ANESTHESIA (OUTPATIENT)
Dept: GASTROENTEROLOGY | Facility: HOSPITAL | Age: 49
End: 2024-02-01

## 2024-02-01 VITALS
RESPIRATION RATE: 18 BRPM | TEMPERATURE: 99.2 F | OXYGEN SATURATION: 96 % | HEART RATE: 68 BPM | SYSTOLIC BLOOD PRESSURE: 114 MMHG | DIASTOLIC BLOOD PRESSURE: 66 MMHG

## 2024-02-01 DIAGNOSIS — Z12.11 SCREENING FOR COLON CANCER: ICD-10-CM

## 2024-02-01 PROBLEM — E66.01 MORBID OBESITY WITH BMI OF 45.0-49.9, ADULT (HCC): Status: ACTIVE | Noted: 2024-02-01

## 2024-02-01 PROCEDURE — 88305 TISSUE EXAM BY PATHOLOGIST: CPT | Performed by: STUDENT IN AN ORGANIZED HEALTH CARE EDUCATION/TRAINING PROGRAM

## 2024-02-01 PROCEDURE — 45380 COLONOSCOPY AND BIOPSY: CPT | Performed by: STUDENT IN AN ORGANIZED HEALTH CARE EDUCATION/TRAINING PROGRAM

## 2024-02-01 PROCEDURE — 45385 COLONOSCOPY W/LESION REMOVAL: CPT | Performed by: STUDENT IN AN ORGANIZED HEALTH CARE EDUCATION/TRAINING PROGRAM

## 2024-02-01 RX ORDER — SODIUM CHLORIDE 9 MG/ML
INJECTION, SOLUTION INTRAVENOUS CONTINUOUS PRN
Status: DISCONTINUED | OUTPATIENT
Start: 2024-02-01 | End: 2024-02-01

## 2024-02-01 RX ORDER — PROPOFOL 10 MG/ML
INJECTION, EMULSION INTRAVENOUS AS NEEDED
Status: DISCONTINUED | OUTPATIENT
Start: 2024-02-01 | End: 2024-02-01

## 2024-02-01 RX ADMIN — PROPOFOL 100 MG: 10 INJECTION, EMULSION INTRAVENOUS at 12:46

## 2024-02-01 RX ADMIN — SODIUM CHLORIDE: 0.9 INJECTION, SOLUTION INTRAVENOUS at 12:30

## 2024-02-01 RX ADMIN — PROPOFOL 50 MG: 10 INJECTION, EMULSION INTRAVENOUS at 12:48

## 2024-02-01 RX ADMIN — PROPOFOL 50 MG: 10 INJECTION, EMULSION INTRAVENOUS at 12:50

## 2024-02-01 RX ADMIN — PROPOFOL 50 MG: 10 INJECTION, EMULSION INTRAVENOUS at 12:56

## 2024-02-01 RX ADMIN — PROPOFOL 50 MG: 10 INJECTION, EMULSION INTRAVENOUS at 13:01

## 2024-02-01 RX ADMIN — PROPOFOL 50 MG: 10 INJECTION, EMULSION INTRAVENOUS at 13:09

## 2024-02-01 RX ADMIN — PROPOFOL 50 MG: 10 INJECTION, EMULSION INTRAVENOUS at 12:54

## 2024-02-01 RX ADMIN — PROPOFOL 50 MG: 10 INJECTION, EMULSION INTRAVENOUS at 13:05

## 2024-02-01 RX ADMIN — PROPOFOL 50 MG: 10 INJECTION, EMULSION INTRAVENOUS at 12:57

## 2024-02-01 NOTE — ANESTHESIA PREPROCEDURE EVALUATION
Procedure:  COLONOSCOPY    Relevant Problems   CARDIO   (+) Mixed hyperlipidemia   (+) Uncontrolled hypertension      NEURO/PSYCH   (+) Anxiety      PULMONARY   (+) ANTWAN (obstructive sleep apnea)        Physical Exam    Airway    Mallampati score: III  TM Distance: >3 FB  Neck ROM: full     Dental   No notable dental hx     Cardiovascular      Pulmonary      Other Findings        Anesthesia Plan  ASA Score- 2     Anesthesia Type- IV sedation with anesthesia with ASA Monitors.         Additional Monitors:     Airway Plan:            Plan Factors-    Chart reviewed.   Existing labs reviewed. Patient summary reviewed.    Patient is not a current smoker.              Induction- intravenous.    Postoperative Plan- Plan for postoperative opioid use.     Informed Consent- Anesthetic plan and risks discussed with patient.  I personally reviewed this patient with the CRNA. Discussed and agreed on the Anesthesia Plan with the CRNA..

## 2024-02-01 NOTE — ANESTHESIA POSTPROCEDURE EVALUATION
Post-Op Assessment Note    CV Status:  Stable  Pain Score: 0    Pain management: adequate       Mental Status:  Alert and awake   Hydration Status:  Euvolemic   PONV Controlled:  Controlled   Airway Patency:  Patent     Post Op Vitals Reviewed: Yes    No anethesia notable event occurred.    Staff: CRNA               BP   103/59   Temp   Rn record   Pulse  73   Resp   16   SpO2   97% 6Lo2 mask

## 2024-02-01 NOTE — H&P
History and Physical - [facility]  Nicolas Mari 48 y.o. male MRN: [id number]    HPI: Nicolas Mari is a 48 y.o. male who presents for screening colonoscopy.    REVIEW OF SYSTEMS: Per the HPI, and otherwise unremarkable.    Historical Information   Past Medical History:   Diagnosis Date    Anxiety 11/7/2023    Hyperkalemia 12/2/2020    Hypertension      No past surgical history on file.  Social History   Social History     Substance and Sexual Activity   Alcohol Use Yes     Social History     Substance and Sexual Activity   Drug Use Never     Social History     Tobacco Use   Smoking Status Never   Smokeless Tobacco Never     Family History   Problem Relation Age of Onset    Hypertension Mother     Coronary artery disease Father     Heart disease Father     Lupus Father     Diabetes Father     Rheum arthritis Sister     Colon cancer Paternal Grandmother        Meds/Allergies       Current Outpatient Medications:     amLODIPine (NORVASC) 10 mg tablet    busPIRone (BUSPAR) 5 mg tablet    escitalopram (Lexapro) 20 mg tablet    hydrochlorothiazide (HYDRODIURIL) 25 mg tablet    lisinopril (ZESTRIL) 40 mg tablet    predniSONE 10 mg tablet    doxycycline monohydrate (MONODOX) 100 mg capsule  No current facility-administered medications for this encounter.    No Known Allergies    Objective     /81   Pulse 94   Temp 99.2 °F (37.3 °C) (Temporal)   Resp 20   SpO2 96%     PHYSICAL EXAM    Gen: NAD AAOx3  Head: Normocephalic, Atraumatic  CV: S1S2 RRR no m/r/g  CHEST: Clear b/l no c/r/w  ABD: soft, +BS NT/ND  EXT: no edema    ASSESSMENT/PLAN:  This is a 48 y.o. year old male here for screening colonoscopy, and he is stable and optimized for his procedure.

## 2024-02-05 PROCEDURE — 88305 TISSUE EXAM BY PATHOLOGIST: CPT | Performed by: STUDENT IN AN ORGANIZED HEALTH CARE EDUCATION/TRAINING PROGRAM

## 2024-02-06 DIAGNOSIS — I10 ESSENTIAL HYPERTENSION: ICD-10-CM

## 2024-02-06 RX ORDER — LISINOPRIL 40 MG/1
40 TABLET ORAL DAILY
Qty: 90 TABLET | Refills: 0 | Status: SHIPPED | OUTPATIENT
Start: 2024-02-06

## 2024-02-13 DIAGNOSIS — F41.9 ANXIETY: ICD-10-CM

## 2024-02-13 RX ORDER — ESCITALOPRAM OXALATE 20 MG/1
20 TABLET ORAL DAILY
Qty: 30 TABLET | Refills: 0 | Status: SHIPPED | OUTPATIENT
Start: 2024-02-13

## 2024-03-12 DIAGNOSIS — F41.9 ANXIETY: ICD-10-CM

## 2024-03-12 RX ORDER — ESCITALOPRAM OXALATE 20 MG/1
20 TABLET ORAL DAILY
Qty: 30 TABLET | Refills: 0 | Status: SHIPPED | OUTPATIENT
Start: 2024-03-12

## 2024-03-26 ENCOUNTER — HOSPITAL ENCOUNTER (OUTPATIENT)
Dept: NON INVASIVE DIAGNOSTICS | Facility: HOSPITAL | Age: 49
Discharge: HOME | End: 2024-03-26
Payer: COMMERCIAL

## 2024-03-26 DIAGNOSIS — I10 UNCONTROLLED HYPERTENSION: ICD-10-CM

## 2024-03-26 PROCEDURE — 93975 VASCULAR STUDY: CPT

## 2024-03-26 PROCEDURE — 93975 VASCULAR STUDY: CPT | Performed by: SURGERY

## 2024-03-28 DIAGNOSIS — F41.9 ANXIETY: ICD-10-CM

## 2024-03-28 RX ORDER — BUSPIRONE HYDROCHLORIDE 5 MG/1
5 TABLET ORAL 2 TIMES DAILY
Qty: 60 TABLET | Refills: 0 | Status: SHIPPED | OUTPATIENT
Start: 2024-03-28

## 2024-04-01 DIAGNOSIS — I10 ESSENTIAL HYPERTENSION: ICD-10-CM

## 2024-04-01 RX ORDER — AMLODIPINE BESYLATE 10 MG/1
10 TABLET ORAL DAILY
Qty: 30 TABLET | Refills: 1 | Status: SHIPPED | OUTPATIENT
Start: 2024-04-01

## 2024-04-09 ENCOUNTER — OFFICE VISIT (OUTPATIENT)
Dept: FAMILY MEDICINE CLINIC | Facility: HOSPITAL | Age: 49
End: 2024-04-09
Payer: COMMERCIAL

## 2024-04-09 VITALS
OXYGEN SATURATION: 96 % | HEIGHT: 70 IN | HEART RATE: 80 BPM | TEMPERATURE: 98.5 F | SYSTOLIC BLOOD PRESSURE: 158 MMHG | WEIGHT: 315 LBS | BODY MASS INDEX: 45.1 KG/M2 | DIASTOLIC BLOOD PRESSURE: 78 MMHG

## 2024-04-09 DIAGNOSIS — I10 UNCONTROLLED HYPERTENSION: Primary | ICD-10-CM

## 2024-04-09 DIAGNOSIS — F41.9 ANXIETY: ICD-10-CM

## 2024-04-09 PROCEDURE — 99214 OFFICE O/P EST MOD 30 MIN: CPT | Performed by: NURSE PRACTITIONER

## 2024-04-09 RX ORDER — METOPROLOL SUCCINATE 25 MG/1
25 TABLET, EXTENDED RELEASE ORAL DAILY
Qty: 30 TABLET | Refills: 1 | Status: SHIPPED | OUTPATIENT
Start: 2024-04-09

## 2024-04-09 RX ORDER — ESCITALOPRAM OXALATE 20 MG/1
20 TABLET ORAL DAILY
Qty: 90 TABLET | Refills: 1 | Status: SHIPPED | OUTPATIENT
Start: 2024-04-09

## 2024-04-09 RX ORDER — BUSPIRONE HYDROCHLORIDE 10 MG/1
10 TABLET ORAL 3 TIMES DAILY
Qty: 90 TABLET | Refills: 1 | Status: SHIPPED | OUTPATIENT
Start: 2024-04-09

## 2024-04-09 NOTE — PROGRESS NOTES
Assessment/Plan:    Anxiety  Does not note much benefit on Buspar.   Increased to 10 mg TID.   We discussed therapy and making lifestyle changes to help reduce stress and anxiety. Continue on 20 mg lexapro. ]  F/U in 4 weeks.     Uncontrolled hypertension  BP remains uncontrolled.   Renal duplex negative.   He will have secondary HTN w/u completed although I feel his uncontrolled HTN is secondary to lifestyle.   Sebastien discussion with pt regarding his alcohol intake, diet and lack of exercise. He has made little to no effort incorporating any changes.   I will add in metoprolol. He is on 3 other agents at max doses.   Advised he check BP at home daily and bring readings to next OV.   F/U in 4 weeks.        Diagnoses and all orders for this visit:    Uncontrolled hypertension  -     metoprolol succinate (TOPROL-XL) 25 mg 24 hr tablet; Take 1 tablet (25 mg total) by mouth daily    Anxiety  -     busPIRone (BUSPAR) 10 mg tablet; Take 1 tablet (10 mg total) by mouth 3 (three) times a day          Subjective:      Patient ID: Nicolas Mari is a 48 y.o. male.    Not checking BP at home. He continues to drink excess alcohol. Reports this is the only way he can relax.   Does not feel Buspar is overly effective although his girlfriend and kids feel he seems more relaxed.   He did have renal duplex completed and was normal. He did not have blood work done. He wasn't aware he needed any until June.         The following portions of the patient's history were reviewed and updated as appropriate: allergies, current medications, past family history, past medical history, past social history, past surgical history and problem list.    Review of Systems   Constitutional:  Negative for unexpected weight change.   Eyes:  Negative for visual disturbance.   Respiratory:  Negative for shortness of breath.    Cardiovascular:  Negative for chest pain, palpitations and leg swelling.   Musculoskeletal:  Negative for myalgias.    Neurological:  Negative for dizziness, weakness, light-headedness, numbness and headaches.   Psychiatric/Behavioral:  Positive for agitation. The patient is nervous/anxious.          Objective:  Vitals:    04/09/24 1434   BP: 158/78   Pulse: 80   Temp: 98.5 °F (36.9 °C)   SpO2: 96%      Physical Exam  Vitals reviewed.   Constitutional:       Appearance: Normal appearance. He is well-developed. He is obese.   Cardiovascular:      Rate and Rhythm: Normal rate and regular rhythm.      Pulses:           Carotid pulses are 2+ on the right side and 2+ on the left side.     Heart sounds: Normal heart sounds. No murmur heard.  Pulmonary:      Effort: Pulmonary effort is normal.      Breath sounds: Normal breath sounds.   Skin:     General: Skin is warm and dry.   Neurological:      Mental Status: He is alert and oriented to person, place, and time.   Psychiatric:         Mood and Affect: Mood normal.         Behavior: Behavior normal.         Thought Content: Thought content normal.         Judgment: Judgment normal.

## 2024-04-09 NOTE — ASSESSMENT & PLAN NOTE
BP remains uncontrolled.   Renal duplex negative.   He will have secondary HTN w/u completed although I feel his uncontrolled HTN is secondary to lifestyle.   Sebastien discussion with pt regarding his alcohol intake, diet and lack of exercise. He has made little to no effort incorporating any changes.   I will add in metoprolol. He is on 3 other agents at max doses.   Advised he check BP at home daily and bring readings to next OV.   F/U in 4 weeks.

## 2024-04-09 NOTE — ASSESSMENT & PLAN NOTE
Does not note much benefit on Buspar.   Increased to 10 mg TID.   We discussed therapy and making lifestyle changes to help reduce stress and anxiety. Continue on 20 mg lexapro. ]  F/U in 4 weeks.

## 2024-04-16 DIAGNOSIS — I10 ESSENTIAL HYPERTENSION: ICD-10-CM

## 2024-04-16 RX ORDER — HYDROCHLOROTHIAZIDE 25 MG/1
25 TABLET ORAL DAILY
Qty: 90 TABLET | Refills: 0 | Status: SHIPPED | OUTPATIENT
Start: 2024-04-16

## 2024-04-30 DIAGNOSIS — I10 ESSENTIAL HYPERTENSION: ICD-10-CM

## 2024-05-02 RX ORDER — LISINOPRIL 40 MG/1
40 TABLET ORAL DAILY
Qty: 30 TABLET | Refills: 0 | Status: SHIPPED | OUTPATIENT
Start: 2024-05-02

## 2024-05-03 LAB
ALBUMIN SERPL-MCNC: 4.7 G/DL (ref 4.1–5.1)
ALBUMIN/GLOB SERPL: 1.9 {RATIO} (ref 1.2–2.2)
ALDOST SERPL-MCNC: 4.8 NG/DL (ref 0–30)
ALDOST/RENIN PLAS-RTO: 0.6 {RATIO} (ref 0–30)
ALP SERPL-CCNC: 47 IU/L (ref 44–121)
ALT SERPL-CCNC: 21 IU/L (ref 0–44)
AST SERPL-CCNC: 16 IU/L (ref 0–40)
BILIRUB SERPL-MCNC: 0.6 MG/DL (ref 0–1.2)
BUN SERPL-MCNC: 17 MG/DL (ref 6–24)
BUN/CREAT SERPL: 20 (ref 9–20)
CALCIUM SERPL-MCNC: 9.6 MG/DL (ref 8.7–10.2)
CHLORIDE SERPL-SCNC: 97 MMOL/L (ref 96–106)
CO2 SERPL-SCNC: 22 MMOL/L (ref 20–29)
CORTIS AM PEAK SERPL-MCNC: 12.3 UG/DL (ref 6.2–19.4)
CREAT SERPL-MCNC: 0.87 MG/DL (ref 0.76–1.27)
CREAT UR-MCNC: 61.7 MG/DL
DOPAMINE 24H UR-MRATE: <30 PG/ML (ref 0–48)
DOPAMINE UR-MCNC: 100 UG/L
DOPAMINE/CREAT UR: 162 UG/G CREAT (ref 0–348)
EGFR: 106 ML/MIN/1.73
EPINEPH PLAS-MCNC: 23 PG/ML (ref 0–62)
EPINEPH UR-MCNC: <3 UG/L
EPINEPH/CREAT UR: <5 UG/G CREAT (ref 0–19)
GLOBULIN SER-MCNC: 2.5 G/DL (ref 1.5–4.5)
GLUCOSE SERPL-MCNC: 106 MG/DL (ref 70–99)
NOREPINEPH PLAS-MCNC: 318 PG/ML (ref 0–874)
NOREPINEPH UR-MCNC: 25 UG/L
NOREPINEPH/CREAT UR: 41 UG/G CREAT (ref 0–111)
POTASSIUM SERPL-SCNC: 4.2 MMOL/L (ref 3.5–5.2)
PROT SERPL-MCNC: 7.2 G/DL (ref 6–8.5)
RENIN PLAS-CCNC: 7.44 NG/ML/HR (ref 0.17–5.38)
SODIUM SERPL-SCNC: 139 MMOL/L (ref 134–144)

## 2024-05-07 ENCOUNTER — OFFICE VISIT (OUTPATIENT)
Dept: FAMILY MEDICINE CLINIC | Facility: HOSPITAL | Age: 49
End: 2024-05-07
Payer: COMMERCIAL

## 2024-05-07 VITALS
BODY MASS INDEX: 45.1 KG/M2 | TEMPERATURE: 98.7 F | WEIGHT: 315 LBS | DIASTOLIC BLOOD PRESSURE: 82 MMHG | SYSTOLIC BLOOD PRESSURE: 150 MMHG | OXYGEN SATURATION: 96 % | HEIGHT: 70 IN | HEART RATE: 83 BPM

## 2024-05-07 DIAGNOSIS — G47.33 OSA (OBSTRUCTIVE SLEEP APNEA): ICD-10-CM

## 2024-05-07 DIAGNOSIS — I10 UNCONTROLLED HYPERTENSION: Primary | ICD-10-CM

## 2024-05-07 DIAGNOSIS — F41.9 ANXIETY: ICD-10-CM

## 2024-05-07 PROCEDURE — 99214 OFFICE O/P EST MOD 30 MIN: CPT | Performed by: NURSE PRACTITIONER

## 2024-05-07 RX ORDER — METOPROLOL SUCCINATE 50 MG/1
50 TABLET, EXTENDED RELEASE ORAL DAILY
Qty: 30 TABLET | Refills: 1 | Status: SHIPPED | OUTPATIENT
Start: 2024-05-07

## 2024-05-07 NOTE — ASSESSMENT & PLAN NOTE
This is improved with increase in Buspar to TID.   Will continue with this regimen.   Will be starting new job also which should help reduce anxiety.

## 2024-05-07 NOTE — ASSESSMENT & PLAN NOTE
There has been mild improvement. Home readings are somewhat better than office reading.   Secondary HTN w/u negative.   He is treated for ANTWAN.   We again discussed lifestyle as contributing to uncontrolled BP.   He is actively, but slowly, making needed changes.   Anxiety is better controlled.   I will increase metoprolol to 50 mg.   Continue to check BP at home. Record and bring both readings and cuff to next OV in 4 weeks.   If BP remains elevated with elevated home readings we discussed referral to cardiology/.

## 2024-05-07 NOTE — PROGRESS NOTES
Assessment/Plan:    Uncontrolled hypertension  There has been mild improvement. Home readings are somewhat better than office reading.   Secondary HTN w/u negative.   He is treated for ANTWAN.   We again discussed lifestyle as contributing to uncontrolled BP.   He is actively, but slowly, making needed changes.   Anxiety is better controlled.   I will increase metoprolol to 50 mg.   Continue to check BP at home. Record and bring both readings and cuff to next OV in 4 weeks.   If BP remains elevated with elevated home readings we discussed referral to cardiology/.     ANTWAN (obstructive sleep apnea)  Compliant with bipap    Anxiety  This is improved with increase in Buspar to TID.   Will continue with this regimen.   Will be starting new job also which should help reduce anxiety.        Diagnoses and all orders for this visit:    Uncontrolled hypertension  -     metoprolol succinate (TOPROL-XL) 50 mg 24 hr tablet; Take 1 tablet (50 mg total) by mouth daily    Anxiety    ANTWAN (obstructive sleep apnea)          Subjective:      Patient ID: Nicolas Mari is a 48 y.o. male.    Checking BP at home. Runninf 130-150s/80s. Has slowly been getting back into walking. Trying to cut back on alcohol. Secondary HTN w/u negative.   Anxiety is improved with increase in Buspar. Switching jobs which should be less stressful.   ANTWAN and compliant with treatment.         The following portions of the patient's history were reviewed and updated as appropriate: allergies, current medications, past family history, past medical history, past social history, past surgical history and problem list.    Review of Systems   Constitutional:  Negative for unexpected weight change.   Eyes:  Negative for visual disturbance.   Respiratory:  Negative for shortness of breath.    Cardiovascular:  Negative for chest pain, palpitations and leg swelling.   Neurological:  Negative for dizziness, weakness, light-headedness, numbness and headaches.    Psychiatric/Behavioral:  The patient is nervous/anxious (but improved).          Objective:  Vitals:    05/07/24 1535   BP: 150/82   Pulse: 83   Temp: 98.7 °F (37.1 °C)   SpO2: 96%      Physical Exam  Vitals reviewed.   Constitutional:       Appearance: Normal appearance. He is obese.   Cardiovascular:      Rate and Rhythm: Normal rate.      Heart sounds: Normal heart sounds. No murmur heard.  Pulmonary:      Effort: Pulmonary effort is normal.      Breath sounds: Normal breath sounds.   Skin:     General: Skin is warm and dry.   Neurological:      Mental Status: He is alert and oriented to person, place, and time.   Psychiatric:         Mood and Affect: Mood normal.         Behavior: Behavior normal.         Thought Content: Thought content normal.         Judgment: Judgment normal.

## 2024-05-28 DIAGNOSIS — F41.9 ANXIETY: ICD-10-CM

## 2024-05-28 DIAGNOSIS — I10 ESSENTIAL HYPERTENSION: ICD-10-CM

## 2024-05-30 RX ORDER — BUSPIRONE HYDROCHLORIDE 10 MG/1
10 TABLET ORAL 3 TIMES DAILY
Qty: 90 TABLET | Refills: 0 | Status: SHIPPED | OUTPATIENT
Start: 2024-05-30

## 2024-05-30 RX ORDER — LISINOPRIL 40 MG/1
40 TABLET ORAL DAILY
Qty: 30 TABLET | Refills: 0 | Status: SHIPPED | OUTPATIENT
Start: 2024-05-30

## 2024-05-31 DIAGNOSIS — I10 ESSENTIAL HYPERTENSION: ICD-10-CM

## 2024-06-01 DIAGNOSIS — I10 UNCONTROLLED HYPERTENSION: ICD-10-CM

## 2024-06-01 RX ORDER — AMLODIPINE BESYLATE 10 MG/1
10 TABLET ORAL DAILY
Qty: 90 TABLET | Refills: 1 | Status: SHIPPED | OUTPATIENT
Start: 2024-06-01

## 2024-06-01 RX ORDER — METOPROLOL SUCCINATE 50 MG/1
50 TABLET, EXTENDED RELEASE ORAL DAILY
Qty: 30 TABLET | Refills: 1 | OUTPATIENT
Start: 2024-06-01

## 2024-06-04 DIAGNOSIS — I10 UNCONTROLLED HYPERTENSION: ICD-10-CM

## 2024-06-05 RX ORDER — METOPROLOL SUCCINATE 25 MG/1
25 TABLET, EXTENDED RELEASE ORAL DAILY
Qty: 30 TABLET | Refills: 5 | Status: SHIPPED | OUTPATIENT
Start: 2024-06-05 | End: 2024-06-06 | Stop reason: ALTCHOICE

## 2024-06-06 ENCOUNTER — OFFICE VISIT (OUTPATIENT)
Dept: FAMILY MEDICINE CLINIC | Facility: HOSPITAL | Age: 49
End: 2024-06-06
Payer: COMMERCIAL

## 2024-06-06 VITALS
TEMPERATURE: 97.9 F | HEIGHT: 70 IN | HEART RATE: 76 BPM | SYSTOLIC BLOOD PRESSURE: 150 MMHG | WEIGHT: 315 LBS | DIASTOLIC BLOOD PRESSURE: 86 MMHG | BODY MASS INDEX: 45.1 KG/M2

## 2024-06-06 DIAGNOSIS — I10 UNCONTROLLED HYPERTENSION: Primary | ICD-10-CM

## 2024-06-06 DIAGNOSIS — F41.9 ANXIETY: ICD-10-CM

## 2024-06-06 PROCEDURE — 99214 OFFICE O/P EST MOD 30 MIN: CPT | Performed by: NURSE PRACTITIONER

## 2024-06-06 NOTE — PROGRESS NOTES
Ambulatory Visit  Name: Nicolas Mari      : 1975      MRN: [id number]  Encounter Provider: SUKH Hernandez  Encounter Date: 2024   Encounter department: [facility] CARE [address]     Assessment & Plan   1. Uncontrolled hypertension  Assessment & Plan:  BP is better controlled based on home readings.   He did bring in cuff for correlation and it did correlate today. His evening BP is more controlled than morning.   Will try moving his metoprolol to evening and the rest will be taken in the morning.   He will continue to monitor BP twice/day.   Plan to f/u in 4 weeks.   2. Anxiety  Assessment & Plan:  Improved with Buspar and new job.   Will continue with current regimen.          History of Present Illness     Has been consistently checking BP is morning (2 hours after taking meds) and in the evening. Takes all meds in the morning. Morning readings run 132-152/70-80s. Evening Bp runs mostly 120-140s. Isolated reading in 9-100 range and 150 range.   Anxiety is improved with switch in job.       Review of Systems   Constitutional:  Negative for unexpected weight change.   Eyes:  Negative for visual disturbance.   Respiratory:  Negative for shortness of breath.    Cardiovascular:  Negative for chest pain, palpitations and leg swelling.   Neurological:  Negative for dizziness, weakness, light-headedness, numbness and headaches.     Past Medical History:   Diagnosis Date    Anxiety 2023    Hyperkalemia 2020    Hypertension      History reviewed. No pertinent surgical history.  Family History   Problem Relation Age of Onset    Hypertension Mother     Coronary artery disease Father     Heart disease Father     Lupus Father     Diabetes Father     Rheum arthritis Sister     Colon cancer Paternal Grandmother      Social History     Tobacco Use    Smoking status: Never    Smokeless tobacco: Never   Vaping Use    Vaping status: Never Used   Substance and Sexual Activity     "Alcohol use: Yes    Drug use: Never    Sexual activity: Yes     Current Outpatient Medications on File Prior to Visit   Medication Sig    amLODIPine (NORVASC) 10 mg tablet Take 1 tablet (10 mg total) by mouth daily    busPIRone (BUSPAR) 10 mg tablet Take 1 tablet (10 mg total) by mouth 3 (three) times a day    escitalopram (LEXAPRO) 20 mg tablet TAKE ONE TABLET BY MOUTH EVERY DAY    hydroCHLOROthiazide 25 mg tablet TAKE ONE TABLET BY MOUTH EVERY DAY    lisinopril (ZESTRIL) 40 mg tablet TAKE ONE TABLET BY MOUTH EVERY DAY    metoprolol succinate (TOPROL-XL) 50 mg 24 hr tablet Take 1 tablet (50 mg total) by mouth daily    predniSONE 10 mg tablet Once  a week as instructed by physician    doxycycline monohydrate (MONODOX) 100 mg capsule Take 100 mg by mouth 2 (two) times a day with meals Once or twice a week as instructed by physician    TWO-THREE (Patient not taking: Reported on 5/7/2024)    [DISCONTINUED] metoprolol succinate (TOPROL-XL) 25 mg 24 hr tablet Take 1 tablet (25 mg total) by mouth daily     No Known Allergies  Immunization History   Administered Date(s) Administered    COVID-19 MODERNA VACC 0.5 ML IM 03/26/2021, 04/23/2021, 01/10/2022    Tdap 10/05/2020, 10/05/2020     Objective     /86 (BP Location: Left arm, Patient Position: Sitting, Cuff Size: Standard)   Pulse 76   Temp 97.9 °F (36.6 °C) (Tympanic)   Ht 5' 10\" (1.778 m)   Wt (!) 148 kg (326 lb)   BMI 46.78 kg/m²     Physical Exam  Vitals reviewed.   Constitutional:       Appearance: Normal appearance. He is well-developed. He is obese.   Cardiovascular:      Rate and Rhythm: Normal rate and regular rhythm.      Pulses:           Carotid pulses are 2+ on the right side and 2+ on the left side.     Heart sounds: Normal heart sounds. No murmur heard.  Pulmonary:      Effort: Pulmonary effort is normal.      Breath sounds: Normal breath sounds.   Skin:     General: Skin is warm and dry.   Neurological:      Mental Status: He is alert and " oriented to person, place, and time.   Psychiatric:         Mood and Affect: Mood normal.         Behavior: Behavior normal.         Thought Content: Thought content normal.         Judgment: Judgment normal.       Administrative Statements

## 2024-06-06 NOTE — ASSESSMENT & PLAN NOTE
BP is better controlled based on home readings.   He did bring in cuff for correlation and it did correlate today. His evening BP is more controlled than morning.   Will try moving his metoprolol to evening and the rest will be taken in the morning.   He will continue to monitor BP twice/day.   Plan to f/u in 4 weeks.   
Improved with Buspar and new job.   Will continue with current regimen.   
non ambulatory, increased extensor tone

## 2024-06-25 DIAGNOSIS — I10 UNCONTROLLED HYPERTENSION: ICD-10-CM

## 2024-06-25 DIAGNOSIS — F41.9 ANXIETY: ICD-10-CM

## 2024-06-25 DIAGNOSIS — I10 ESSENTIAL HYPERTENSION: ICD-10-CM

## 2024-06-25 RX ORDER — LISINOPRIL 40 MG/1
40 TABLET ORAL DAILY
Qty: 30 TABLET | Refills: 5 | Status: SHIPPED | OUTPATIENT
Start: 2024-06-25

## 2024-06-25 RX ORDER — BUSPIRONE HYDROCHLORIDE 10 MG/1
10 TABLET ORAL 3 TIMES DAILY
Qty: 90 TABLET | Refills: 1 | Status: SHIPPED | OUTPATIENT
Start: 2024-06-25

## 2024-06-25 RX ORDER — METOPROLOL SUCCINATE 50 MG/1
50 TABLET, EXTENDED RELEASE ORAL DAILY
Qty: 30 TABLET | Refills: 5 | Status: SHIPPED | OUTPATIENT
Start: 2024-06-25 | End: 2024-07-01 | Stop reason: SDUPTHER

## 2024-07-01 DIAGNOSIS — I10 UNCONTROLLED HYPERTENSION: ICD-10-CM

## 2024-07-01 RX ORDER — METOPROLOL SUCCINATE 50 MG/1
50 TABLET, EXTENDED RELEASE ORAL DAILY
Qty: 90 TABLET | Refills: 0 | Status: SHIPPED | OUTPATIENT
Start: 2024-07-01

## 2024-07-18 DIAGNOSIS — I10 ESSENTIAL HYPERTENSION: ICD-10-CM

## 2024-07-19 RX ORDER — HYDROCHLOROTHIAZIDE 25 MG/1
25 TABLET ORAL DAILY
Qty: 100 TABLET | Refills: 1 | Status: SHIPPED | OUTPATIENT
Start: 2024-07-19

## 2024-07-22 DIAGNOSIS — F41.9 ANXIETY: ICD-10-CM

## 2024-07-22 RX ORDER — ESCITALOPRAM OXALATE 20 MG/1
20 TABLET ORAL DAILY
Qty: 100 TABLET | Refills: 1 | Status: SHIPPED | OUTPATIENT
Start: 2024-07-22

## 2024-08-02 DIAGNOSIS — I10 ESSENTIAL HYPERTENSION: ICD-10-CM

## 2024-08-10 RX ORDER — LISINOPRIL 40 MG/1
40 TABLET ORAL DAILY
Qty: 90 TABLET | Refills: 1 | Status: SHIPPED | OUTPATIENT
Start: 2024-08-10

## 2024-08-19 DIAGNOSIS — I10 ESSENTIAL HYPERTENSION: ICD-10-CM

## 2024-08-19 RX ORDER — HYDROCHLOROTHIAZIDE 25 MG/1
25 TABLET ORAL DAILY
Qty: 100 TABLET | Refills: 1 | Status: CANCELLED | OUTPATIENT
Start: 2024-08-19

## 2024-08-19 RX ORDER — HYDROCHLOROTHIAZIDE 25 MG/1
25 TABLET ORAL DAILY
Qty: 100 TABLET | Refills: 1 | Status: SHIPPED | OUTPATIENT
Start: 2024-08-19

## 2024-08-28 ENCOUNTER — OFFICE VISIT (OUTPATIENT)
Dept: FAMILY MEDICINE CLINIC | Facility: HOSPITAL | Age: 49
End: 2024-08-28
Payer: COMMERCIAL

## 2024-08-28 VITALS
SYSTOLIC BLOOD PRESSURE: 136 MMHG | BODY MASS INDEX: 45.1 KG/M2 | TEMPERATURE: 98.7 F | WEIGHT: 315 LBS | HEIGHT: 70 IN | HEART RATE: 68 BPM | DIASTOLIC BLOOD PRESSURE: 80 MMHG

## 2024-08-28 DIAGNOSIS — F41.9 ANXIETY: ICD-10-CM

## 2024-08-28 DIAGNOSIS — I10 UNCONTROLLED HYPERTENSION: Primary | ICD-10-CM

## 2024-08-28 PROCEDURE — 99214 OFFICE O/P EST MOD 30 MIN: CPT | Performed by: NURSE PRACTITIONER

## 2024-08-28 NOTE — ASSESSMENT & PLAN NOTE
Very well controlled   PHQ-9=1/ ONEL-7=2  Continue on current regimen.   Call with worsening mood.

## 2024-08-28 NOTE — PROGRESS NOTES
Ambulatory Visit  Name: Nicolas Mari      : 1975      MRN: [id number]  Encounter Provider: SUKH Hernandez  Encounter Date: 2024   Encounter department: [facility] PRIMARY CARE [address]     Assessment & Plan   1. Uncontrolled hypertension  Assessment & Plan:  BP is much improved.   He provided home readings which are mostly in controlled range. A few isolated readings in 140-150 range.   Will continue on current regimen.   He will continue to check BP and call if consistently > 140/90.   F/U in 3 months.   2. Anxiety  Assessment & Plan:  Very well controlled   PHQ-9=1/ ONEL-7=2  Continue on current regimen.   Call with worsening mood.       Depression Screening and Follow-up Plan: Patient was screened for depression during today's encounter. They screened negative with a PHQ-2 score of 1.        History of Present Illness     He is doing well. Checking Bp twice/day. Running 120-130s. Occasional 140-150 range. He has switched metoprolol to evening.   Anxiety is well controlled.      Review of Systems   Constitutional:  Negative for appetite change, fatigue and unexpected weight change.   Eyes:  Negative for visual disturbance.   Respiratory:  Negative for shortness of breath.    Cardiovascular:  Negative for chest pain, palpitations and leg swelling.   Musculoskeletal:  Negative for myalgias.   Neurological:  Negative for dizziness, weakness, light-headedness, numbness and headaches.   Psychiatric/Behavioral:  Positive for agitation. Negative for decreased concentration, dysphoric mood, sleep disturbance and suicidal ideas. The patient is not nervous/anxious.      Past Medical History:   Diagnosis Date    Anxiety 2023    Hyperkalemia 2020    Hypertension      History reviewed. No pertinent surgical history.  Family History   Problem Relation Age of Onset    Hypertension Mother     Coronary artery disease Father     Heart disease Father     Lupus Father     Diabetes Father   "   Rheum arthritis Sister     Colon cancer Paternal Grandmother      Social History     Tobacco Use    Smoking status: Never    Smokeless tobacco: Never   Vaping Use    Vaping status: Never Used   Substance and Sexual Activity    Alcohol use: Yes    Drug use: Never    Sexual activity: Yes     Current Outpatient Medications on File Prior to Visit   Medication Sig    amLODIPine (NORVASC) 10 mg tablet Take 1 tablet (10 mg total) by mouth daily    busPIRone (BUSPAR) 10 mg tablet Take 1 tablet (10 mg total) by mouth 3 (three) times a day    escitalopram (LEXAPRO) 20 mg tablet TAKE ONE TABLET BY MOUTH EVERY DAY    hydroCHLOROthiazide 25 mg tablet Take 1 tablet (25 mg total) by mouth daily    lisinopril (ZESTRIL) 40 mg tablet Take 1 tablet (40 mg total) by mouth daily    metoprolol succinate (TOPROL-XL) 50 mg 24 hr tablet Take 1 tablet (50 mg total) by mouth daily    [DISCONTINUED] doxycycline monohydrate (MONODOX) 100 mg capsule Take 100 mg by mouth 2 (two) times a day with meals Once or twice a week as instructed by physician    TWO-THREE (Patient not taking: Reported on 8/28/2024)    [DISCONTINUED] predniSONE 10 mg tablet Once  a week as instructed by physician (Patient not taking: Reported on 8/28/2024)     No Known Allergies  Immunization History   Administered Date(s) Administered    COVID-19 MODERNA VACC 0.5 ML IM 03/26/2021, 04/23/2021, 01/10/2022    Tdap 10/05/2020, 10/05/2020     Objective     /80 (BP Location: Left arm, Patient Position: Sitting, Cuff Size: Standard)   Pulse 68   Temp 98.7 °F (37.1 °C) (Tympanic)   Ht 5' 10\" (1.778 m)   Wt (!) 150 kg (331 lb)   BMI 47.49 kg/m²     Physical Exam  Vitals reviewed.   Constitutional:       Appearance: Normal appearance. He is well-developed. He is obese.   Cardiovascular:      Rate and Rhythm: Normal rate and regular rhythm.      Pulses:           Carotid pulses are 2+ on the right side and 2+ on the left side.     Heart sounds: Normal heart sounds. No " murmur heard.  Pulmonary:      Effort: Pulmonary effort is normal.      Breath sounds: Normal breath sounds.   Skin:     General: Skin is warm and dry.   Neurological:      Mental Status: He is alert and oriented to person, place, and time.   Psychiatric:         Mood and Affect: Mood normal.         Behavior: Behavior normal.         Thought Content: Thought content normal.         Judgment: Judgment normal.       Administrative Statements   I have spent a total time of 20 minutes in caring for this patient on the day of the visit/encounter including Instructions for management, Risk factor reductions, Documenting in the medical record, Reviewing / ordering tests, medicine, procedures  , and Obtaining or reviewing history  .

## 2024-08-28 NOTE — ASSESSMENT & PLAN NOTE
BP is much improved.   He provided home readings which are mostly in controlled range. A few isolated readings in 140-150 range.   Will continue on current regimen.   He will continue to check BP and call if consistently > 140/90.   F/U in 3 months.

## 2024-09-03 DIAGNOSIS — I10 ESSENTIAL HYPERTENSION: ICD-10-CM

## 2024-09-03 DIAGNOSIS — F41.9 ANXIETY: ICD-10-CM

## 2024-09-04 RX ORDER — AMLODIPINE BESYLATE 10 MG/1
10 TABLET ORAL DAILY
Qty: 90 TABLET | Refills: 1 | Status: SHIPPED | OUTPATIENT
Start: 2024-09-04

## 2024-09-04 RX ORDER — ESCITALOPRAM OXALATE 20 MG/1
20 TABLET ORAL DAILY
Qty: 100 TABLET | Refills: 1 | Status: SHIPPED | OUTPATIENT
Start: 2024-09-04

## 2024-09-09 DIAGNOSIS — F41.9 ANXIETY: ICD-10-CM

## 2024-09-09 DIAGNOSIS — I10 UNCONTROLLED HYPERTENSION: ICD-10-CM

## 2024-09-09 DIAGNOSIS — I10 ESSENTIAL HYPERTENSION: ICD-10-CM

## 2024-09-09 RX ORDER — LISINOPRIL 40 MG/1
40 TABLET ORAL DAILY
Qty: 90 TABLET | Refills: 1 | Status: SHIPPED | OUTPATIENT
Start: 2024-09-09

## 2024-09-09 RX ORDER — METOPROLOL SUCCINATE 50 MG/1
50 TABLET, EXTENDED RELEASE ORAL DAILY
Qty: 90 TABLET | Refills: 1 | Status: SHIPPED | OUTPATIENT
Start: 2024-09-09

## 2024-09-09 RX ORDER — HYDROCHLOROTHIAZIDE 25 MG/1
25 TABLET ORAL DAILY
Qty: 90 TABLET | Refills: 1 | Status: SHIPPED | OUTPATIENT
Start: 2024-09-09

## 2024-09-09 RX ORDER — BUSPIRONE HYDROCHLORIDE 10 MG/1
10 TABLET ORAL 3 TIMES DAILY
Qty: 90 TABLET | Refills: 1 | Status: SHIPPED | OUTPATIENT
Start: 2024-09-09 | End: 2024-09-18

## 2024-09-17 ENCOUNTER — OFFICE VISIT (OUTPATIENT)
Dept: SLEEP CENTER | Facility: HOSPITAL | Age: 49
End: 2024-09-17
Payer: COMMERCIAL

## 2024-09-17 VITALS
HEIGHT: 70 IN | BODY MASS INDEX: 45.1 KG/M2 | SYSTOLIC BLOOD PRESSURE: 130 MMHG | WEIGHT: 315 LBS | DIASTOLIC BLOOD PRESSURE: 72 MMHG

## 2024-09-17 DIAGNOSIS — R68.2 DRY MOUTH: ICD-10-CM

## 2024-09-17 DIAGNOSIS — R40.0 DAYTIME SLEEPINESS: ICD-10-CM

## 2024-09-17 DIAGNOSIS — L71.9 ROSACEA: ICD-10-CM

## 2024-09-17 DIAGNOSIS — E66.01 MORBID OBESITY WITH BMI OF 40.0-44.9, ADULT (HCC): ICD-10-CM

## 2024-09-17 DIAGNOSIS — F51.12 INSUFFICIENT SLEEP SYNDROME: ICD-10-CM

## 2024-09-17 DIAGNOSIS — G47.33 OSA (OBSTRUCTIVE SLEEP APNEA): Primary | ICD-10-CM

## 2024-09-17 DIAGNOSIS — I10 ESSENTIAL HYPERTENSION: ICD-10-CM

## 2024-09-17 DIAGNOSIS — F41.9 ANXIETY: ICD-10-CM

## 2024-09-17 PROCEDURE — 99214 OFFICE O/P EST MOD 30 MIN: CPT | Performed by: INTERNAL MEDICINE

## 2024-09-17 NOTE — PROGRESS NOTES
"  Follow-Up Note - Sleep Center   Nicolas Mari  48 y.o. male  :1975  MRN:[id number]  DOS:2024    CC: I saw this patient for follow-up in clinic today for Sleep disordered breathing, Coexisting Sleep and Medical Problems.  He is using a ResMed machine.. Interval changes: None reported.      A home sleep study in 2020 demonstrated respiratory event index of 87.8 hour.  Minimal oxygen saturation was 63% and 71.2% of the study was spent with saturations below 90%.  A small index was 60.9%.  During a subsequent therapeutic study, sleep disordered breathing was successfully remediated with BiPAP at 21/17 cm H2O.     PFSH, Problem List, Medications & Allergies were reviewed in EMR.   He  has a past medical history of Anxiety (2023), Hyperkalemia (2020), and Hypertension.    He has a current medication list which includes the following prescription(s): amlodipine, buspirone, escitalopram, hydrochlorothiazide, lisinopril, and metoprolol succinate.    PHYSIOLOGICAL DATA REVIEW : Using PAP > 4 hours/night 87%. Estimated TAMIE 4.5/hour with pressure of 21/17cm H2O@90th/95th percentile; use is limited by  .  INTERPRETATION: Compliance is Very good; Pressure setting is:within target range; ;   SUBJECTIVE: With respect to use of PAP, Nicolas  is experiencing minimal adverse effects: dry mouth/throat.He derives benefit..  Feels satisfied with sleep and daytime function.   Sleep Routine: Nicolas reports getting  8 hrs sleep; he has no difficulty initiating or maintaining sleep . He arises needing an alarm and  feels refreshed.Nicolas ]denies excessive daytime sleepiness,.  He rated himself at Total score: (P) 3 /24 on the [place] Sleepiness Scale.   Other issues: None reported.     Habits: Reports that he has never smoked. He has never used smokeless tobacco.,  Reports current alcohol use.,  Reports no history of drug use., Caffeine use:limited until  9 AM; Exercise routine: \"not enough\" limited " "by foot pain.      ROS: Significant for weight gain.  Started on Lexapro for anxiety that is controlled.  He has facial rash due to rosacea for which she is under treatment.  Review of systems was otherwise negative..    EXAM: /72   Ht 5' 10\" (1.778 m)   Wt (!) 152 kg (334 lb)   BMI 47.92 kg/m²     Wt Readings from Last 3 Encounters:   09/17/24 (!) 152 kg (334 lb)   08/28/24 (!) 150 kg (331 lb)   06/06/24 (!) 148 kg (326 lb)      Patient is well groomed; well appearing.   CNS: Alert, orientated, speech clear & coherent  Psych: cooperative and in no distress. Mental state: Appears normal.  H&N: EOMI; NC/AT: Severe facial rash due to rosacea.    Skin/Extrem: col & hydration normal; no edema  Resp: Respiratory effort is normal  Physical findings otherwise essentially unchanged from previous.    IMPRESSION: Problem List Items & Comorbidities Addressed this Visit    1. ANTWAN (obstructive sleep apnea)  PAP DME Resupply/Reorder      2. Insufficient sleep syndrome        3. Daytime sleepiness        4. Dry mouth        5. Essential hypertension        6. Morbid obesity with BMI of 40.0-44.9, adult (HCC)        7. Anxiety        8. Rosacea            PLAN:  I reviewed results of prior studies and physiologic data with the patient.   I discussed treatment options with risks and benefits.  Treatment with  PAP is medically necessary and Nicolas is agreable to continue use.   Care of equipment, methods to improve comfort using PAP and importance of compliance with therapy were discussed.  Pressure setting: continue 21/17 cmH2O using the [place] interface because of habitual mouth breathing and rosacea.    Rx provided to replace supplies and Care coordinated with DME provider.   Discussed strategies for weight reduction.    He is under care of dermatologist for rosacea   Follow-up is advised in 1 year or sooner if needed to monitor progress, compliance and to adjust therapy.     Thank you for allowing me to participate in " "the care of this patient.    Sincerely,     Authenticated electronically on 09/17/24   Board Certified Specialist     Portions of the record may have been created with voice recognition software. Occasional wrong word or \"sound a like\" substitutions may have occurred due to the inherent limitations of voice recognition software. There may also be notations and random deletions of words or characters from malfunctioning software. Read the chart carefully and recognize, using context, where substitutions/deletions have occurred.    "

## 2024-09-18 RX ORDER — BUSPIRONE HYDROCHLORIDE 10 MG/1
10 TABLET ORAL 3 TIMES DAILY
Qty: 180 TABLET | Refills: 1 | Status: SHIPPED | OUTPATIENT
Start: 2024-09-18

## 2024-09-23 ENCOUNTER — TELEPHONE (OUTPATIENT)
Dept: SLEEP CENTER | Facility: CLINIC | Age: 49
End: 2024-09-23

## 2024-09-23 LAB

## 2024-09-23 NOTE — TELEPHONE ENCOUNTER
Rx for resupply and clinicals [facility].

## 2024-11-01 DIAGNOSIS — I10 UNCONTROLLED HYPERTENSION: ICD-10-CM

## 2024-11-01 DIAGNOSIS — I10 ESSENTIAL HYPERTENSION: ICD-10-CM

## 2024-11-01 DIAGNOSIS — F41.9 ANXIETY: ICD-10-CM

## 2024-11-01 RX ORDER — BUSPIRONE HYDROCHLORIDE 10 MG/1
10 TABLET ORAL 3 TIMES DAILY
Qty: 180 TABLET | Refills: 2 | Status: SHIPPED | OUTPATIENT
Start: 2024-11-01

## 2024-11-01 RX ORDER — ESCITALOPRAM OXALATE 20 MG/1
20 TABLET ORAL DAILY
Qty: 100 TABLET | Refills: 1 | Status: SHIPPED | OUTPATIENT
Start: 2024-11-01

## 2024-11-01 RX ORDER — HYDROCHLOROTHIAZIDE 25 MG/1
25 TABLET ORAL DAILY
Qty: 90 TABLET | Refills: 1 | Status: SHIPPED | OUTPATIENT
Start: 2024-11-01

## 2024-11-01 RX ORDER — LISINOPRIL 40 MG/1
40 TABLET ORAL DAILY
Qty: 90 TABLET | Refills: 1 | Status: SHIPPED | OUTPATIENT
Start: 2024-11-01

## 2024-11-01 RX ORDER — METOPROLOL SUCCINATE 50 MG/1
50 TABLET, EXTENDED RELEASE ORAL DAILY
Qty: 90 TABLET | Refills: 1 | Status: SHIPPED | OUTPATIENT
Start: 2024-11-01

## 2024-11-01 RX ORDER — AMLODIPINE BESYLATE 10 MG/1
10 TABLET ORAL DAILY
Qty: 90 TABLET | Refills: 1 | Status: SHIPPED | OUTPATIENT
Start: 2024-11-01

## 2024-12-02 DIAGNOSIS — R20.2 NUMBNESS AND TINGLING IN RIGHT HAND: ICD-10-CM

## 2024-12-02 DIAGNOSIS — R20.0 NUMBNESS AND TINGLING IN RIGHT HAND: ICD-10-CM

## 2024-12-02 DIAGNOSIS — M79.641 RIGHT HAND PAIN: Primary | ICD-10-CM

## 2024-12-11 ENCOUNTER — RESULTS FOLLOW-UP (OUTPATIENT)
Dept: FAMILY MEDICINE CLINIC | Facility: HOSPITAL | Age: 49
End: 2024-12-11

## 2024-12-11 LAB
ALBUMIN SERPL-MCNC: 4.5 G/DL (ref 4.1–5.1)
ALP SERPL-CCNC: 54 IU/L (ref 44–121)
ALT SERPL-CCNC: 21 IU/L (ref 0–44)
AST SERPL-CCNC: 16 IU/L (ref 0–40)
BASOPHILS # BLD AUTO: 0 X10E3/UL (ref 0–0.2)
BASOPHILS NFR BLD AUTO: 0 %
BILIRUB SERPL-MCNC: 0.6 MG/DL (ref 0–1.2)
BUN SERPL-MCNC: 18 MG/DL (ref 6–24)
BUN/CREAT SERPL: 19 (ref 9–20)
CALCIUM SERPL-MCNC: 9.8 MG/DL (ref 8.7–10.2)
CHLORIDE SERPL-SCNC: 101 MMOL/L (ref 96–106)
CHOLEST SERPL-MCNC: 205 MG/DL (ref 100–199)
CHOLEST/HDLC SERPL: 5.1 RATIO (ref 0–5)
CO2 SERPL-SCNC: 21 MMOL/L (ref 20–29)
CREAT SERPL-MCNC: 0.97 MG/DL (ref 0.76–1.27)
EGFR: 95 ML/MIN/1.73
EOSINOPHIL # BLD AUTO: 0.2 X10E3/UL (ref 0–0.4)
EOSINOPHIL NFR BLD AUTO: 2 %
ERYTHROCYTE [DISTWIDTH] IN BLOOD BY AUTOMATED COUNT: 12.8 % (ref 11.6–15.4)
EST. AVERAGE GLUCOSE BLD GHB EST-MCNC: 117 MG/DL
GLOBULIN SER-MCNC: 2.6 G/DL (ref 1.5–4.5)
GLUCOSE SERPL-MCNC: 138 MG/DL (ref 70–99)
HBA1C MFR BLD: 5.7 % (ref 4.8–5.6)
HCT VFR BLD AUTO: 40.7 % (ref 37.5–51)
HDLC SERPL-MCNC: 40 MG/DL
HGB BLD-MCNC: 13.4 G/DL (ref 13–17.7)
IMM GRANULOCYTES # BLD: 0.1 X10E3/UL (ref 0–0.1)
IMM GRANULOCYTES NFR BLD: 1 %
LDL DIRECT COMMENT: ABNORMAL
LDLC SERPL CALC-MCNC: 120 MG/DL (ref 0–99)
LDLC SERPL DIRECT ASSAY-MCNC: 123 MG/DL (ref 0–99)
LYMPHOCYTES # BLD AUTO: 1.4 X10E3/UL (ref 0.7–3.1)
LYMPHOCYTES NFR BLD AUTO: 20 %
MCH RBC QN AUTO: 29.4 PG (ref 26.6–33)
MCHC RBC AUTO-ENTMCNC: 32.9 G/DL (ref 31.5–35.7)
MCV RBC AUTO: 89 FL (ref 79–97)
MONOCYTES # BLD AUTO: 0.9 X10E3/UL (ref 0.1–0.9)
MONOCYTES NFR BLD AUTO: 13 %
NEUTROPHILS # BLD AUTO: 4.6 X10E3/UL (ref 1.4–7)
NEUTROPHILS NFR BLD AUTO: 64 %
PLATELET # BLD AUTO: 243 X10E3/UL (ref 150–450)
POTASSIUM SERPL-SCNC: 4.9 MMOL/L (ref 3.5–5.2)
PROT SERPL-MCNC: 7.1 G/DL (ref 6–8.5)
RBC # BLD AUTO: 4.56 X10E6/UL (ref 4.14–5.8)
SL AMB VLDL CHOLESTEROL CALC: 45 MG/DL (ref 5–40)
SODIUM SERPL-SCNC: 141 MMOL/L (ref 134–144)
TRIGL SERPL-MCNC: 256 MG/DL (ref 0–149)
WBC # BLD AUTO: 7.2 X10E3/UL (ref 3.4–10.8)

## 2024-12-17 ENCOUNTER — OFFICE VISIT (OUTPATIENT)
Dept: FAMILY MEDICINE CLINIC | Facility: HOSPITAL | Age: 49
End: 2024-12-17
Payer: COMMERCIAL

## 2024-12-17 VITALS
HEIGHT: 70 IN | BODY MASS INDEX: 45.1 KG/M2 | SYSTOLIC BLOOD PRESSURE: 139 MMHG | WEIGHT: 315 LBS | HEART RATE: 72 BPM | TEMPERATURE: 98.7 F | DIASTOLIC BLOOD PRESSURE: 86 MMHG

## 2024-12-17 DIAGNOSIS — R73.01 IMPAIRED FASTING GLUCOSE: ICD-10-CM

## 2024-12-17 DIAGNOSIS — F41.9 ANXIETY: ICD-10-CM

## 2024-12-17 DIAGNOSIS — I10 PRIMARY HYPERTENSION: Primary | ICD-10-CM

## 2024-12-17 DIAGNOSIS — E78.2 MIXED HYPERLIPIDEMIA: ICD-10-CM

## 2024-12-17 PROCEDURE — 99214 OFFICE O/P EST MOD 30 MIN: CPT | Performed by: NURSE PRACTITIONER

## 2024-12-17 NOTE — ASSESSMENT & PLAN NOTE
A1C up to 5.7.   Discussed prediabetes.   Discussed diet and exercise.   Plan to recheck in 6 months.   Orders:    Hemoglobin A1C; Future

## 2024-12-17 NOTE — PROGRESS NOTES
"Name: Nicolas Mari      : 1975      MRN: [id number]  Encounter Provider: SUKH Hernandez  Encounter Date: 2024   Encounter department: [facility] CARE [address]   :  Assessment & Plan  Primary hypertension  Bp high in office today but with well controlled home readings.   BP from this morning was controlled.   No change in regimen.   Plan to f/u in 6 months.   Call if BP is consisently > 140/90.        Impaired fasting glucose  A1C up to 5.7.   Discussed prediabetes.   Discussed diet and exercise.   Plan to recheck in 6 months.   Orders:    Hemoglobin A1C; Future    Mixed hyperlipidemia  TG up higher as is LDL.   We discussed diet and exercise.   Recheck in 6 months  Orders:    Lipid panel; Future    Anxiety  Well controlled.   No change in regimen.   F/U in 6 months.               History of Present Illness     He has been anxious about his lab results. Thinks that is why BP is elevated. He has been checking his BP at home and has been running in 120-130 range untile the last few days.   He has reduced the amount of alcohol he drinks. No exercise.   Anxiety is controlled.       Review of Systems   Constitutional:  Negative for unexpected weight change.   Eyes:  Negative for visual disturbance.   Respiratory:  Negative for shortness of breath.    Cardiovascular:  Negative for chest pain, palpitations and leg swelling.   Neurological:  Negative for dizziness, weakness, light-headedness, numbness and headaches.   Psychiatric/Behavioral:  The patient is not nervous/anxious.        Objective   /86 Comment: HOME READING FROM 24 at 1030  Pulse 72   Temp 98.7 °F (37.1 °C) (Tympanic)   Ht 5' 10\" (1.778 m)   Wt (!) 155 kg (342 lb)   BMI 49.07 kg/m²      Physical Exam  Vitals reviewed.   Constitutional:       Appearance: Normal appearance. He is well-developed. He is obese.   Cardiovascular:      Rate and Rhythm: Normal rate and regular rhythm.      Pulses:           " Carotid pulses are 2+ on the right side and 2+ on the left side.     Heart sounds: Normal heart sounds. No murmur heard.  Pulmonary:      Effort: Pulmonary effort is normal.      Breath sounds: Normal breath sounds.   Skin:     General: Skin is warm and dry.   Neurological:      Mental Status: He is alert and oriented to person, place, and time.   Psychiatric:         Mood and Affect: Mood normal.         Behavior: Behavior normal.         Thought Content: Thought content normal.         Judgment: Judgment normal.

## 2024-12-17 NOTE — ASSESSMENT & PLAN NOTE
TG up higher as is LDL.   We discussed diet and exercise.   Recheck in 6 months  Orders:    Lipid panel; Future

## 2024-12-17 NOTE — ASSESSMENT & PLAN NOTE
Bp high in office today but with well controlled home readings.   BP from this morning was controlled.   No change in regimen.   Plan to f/u in 6 months.   Call if BP is consisently > 140/90.

## 2025-01-07 ENCOUNTER — TELEPHONE (OUTPATIENT)
Age: 50
End: 2025-01-07

## 2025-01-07 ENCOUNTER — OFFICE VISIT (OUTPATIENT)
Dept: FAMILY MEDICINE CLINIC | Facility: HOSPITAL | Age: 50
End: 2025-01-07
Payer: COMMERCIAL

## 2025-01-07 ENCOUNTER — OFFICE VISIT (OUTPATIENT)
Dept: OBGYN CLINIC | Facility: CLINIC | Age: 50
End: 2025-01-07
Payer: COMMERCIAL

## 2025-01-07 VITALS
HEART RATE: 72 BPM | WEIGHT: 315 LBS | OXYGEN SATURATION: 98 % | BODY MASS INDEX: 45.1 KG/M2 | HEIGHT: 70 IN | DIASTOLIC BLOOD PRESSURE: 82 MMHG | SYSTOLIC BLOOD PRESSURE: 140 MMHG | TEMPERATURE: 98.2 F

## 2025-01-07 DIAGNOSIS — E66.01 MORBID OBESITY WITH BMI OF 45.0-49.9, ADULT (HCC): Primary | ICD-10-CM

## 2025-01-07 DIAGNOSIS — M79.642 PAIN IN LEFT HAND: ICD-10-CM

## 2025-01-07 DIAGNOSIS — R20.0 NUMBNESS AND TINGLING IN RIGHT HAND: ICD-10-CM

## 2025-01-07 DIAGNOSIS — I10 PRIMARY HYPERTENSION: ICD-10-CM

## 2025-01-07 DIAGNOSIS — R20.2 NUMBNESS AND TINGLING IN RIGHT HAND: ICD-10-CM

## 2025-01-07 DIAGNOSIS — M79.641 RIGHT HAND PAIN: ICD-10-CM

## 2025-01-07 DIAGNOSIS — G47.33 OSA (OBSTRUCTIVE SLEEP APNEA): ICD-10-CM

## 2025-01-07 DIAGNOSIS — R20.0 NUMBNESS AND TINGLING OF LEFT HAND: Primary | ICD-10-CM

## 2025-01-07 DIAGNOSIS — R20.2 NUMBNESS AND TINGLING OF LEFT HAND: Primary | ICD-10-CM

## 2025-01-07 PROCEDURE — 99203 OFFICE O/P NEW LOW 30 MIN: CPT | Performed by: SURGERY

## 2025-01-07 PROCEDURE — 99214 OFFICE O/P EST MOD 30 MIN: CPT | Performed by: NURSE PRACTITIONER

## 2025-01-07 RX ORDER — TIRZEPATIDE 2.5 MG/.5ML
2.5 INJECTION, SOLUTION SUBCUTANEOUS WEEKLY
Qty: 2 ML | Refills: 0 | Status: SHIPPED | OUTPATIENT
Start: 2025-01-07

## 2025-01-07 NOTE — PROGRESS NOTES
ORTHOPAEDIC HAND, WRIST, AND ELBOW OFFICE  VISIT       ASSESSMENT/PLAN:      49 y.o. year old male who presents with bilateral hand numbness    Physical exam performed and findings discussed  Recommend night time splinting. Splints dispensed. Can take about 6 weeks to see resolution/reduction in symptoms  MSK US of bilateral carpal and cubital tunnels ordered  NSAIDs as needed for discomfort      The patient verbalized understanding of exam findings and treatment plan. We engaged in the shared decision-making process and treatment options were discussed at length with the patient. Surgical and conservative management discussed today along with risks and benefits.    Diagnoses and all orders for this visit:    Numbness and tingling of left hand  -     US MSK limited; Future  -     US MSK limited; Future    Right hand pain  -     Ambulatory Referral to Orthopedic Surgery  -     US MSK limited; Future  -     US MSK limited; Future    Numbness and tingling in right hand  -     Ambulatory Referral to Orthopedic Surgery  -     US MSK limited; Future  -     US MSK limited; Future    Pain in left hand  -     US MSK limited; Future  -     US MSK limited; Future        Follow Up:  Return in about 6 weeks (around 2/18/2025) for Recheck, After Ultrasound.    To Do Next Visit:  Re-evaluation of current issue          ____________________________________________________________________________________________________________________________________________      CHIEF COMPLAINT:  Chief Complaint   Patient presents with    Right Hand - Pain, Numbness    Left Hand - Pain, Numbness       SUBJECTIVE:  Nicolas Mari is a 49 y.o. year old  male who presents for evaluation bilateral hand numbness    Patient states that he has been having numbness in his hands thumb, index and middle fingers for the last 6 months. The last 2 months the symptoms have been near constant. The numbness and tingling as well as pain will wake him up at  nights as well and he has to shake his hands out.  Saw his PCP who referred him to orthopedics. No testing no treatments      I have personally reviewed all the relevant PMH, PSH, SH, FH, Medications and allergies      PAST MEDICAL HISTORY:  Past Medical History:   Diagnosis Date    Anxiety 11/7/2023    Hyperkalemia 12/2/2020    Hypertension        PAST SURGICAL HISTORY:  No past surgical history on file.    FAMILY HISTORY:  Family History   Problem Relation Age of Onset    Hypertension Mother     Coronary artery disease Father     Heart disease Father     Lupus Father     Diabetes Father     Rheum arthritis Sister     Colon cancer Paternal Grandmother        SOCIAL HISTORY:  Social History     Tobacco Use    Smoking status: Never    Smokeless tobacco: Never   Vaping Use    Vaping status: Never Used   Substance Use Topics    Alcohol use: Yes    Drug use: Never       MEDICATIONS:    Current Outpatient Medications:     amLODIPine (NORVASC) 10 mg tablet, Take 1 tablet (10 mg total) by mouth daily, Disp: 90 tablet, Rfl: 1    busPIRone (BUSPAR) 10 mg tablet, Take 1 tablet (10 mg total) by mouth 3 (three) times a day, Disp: 180 tablet, Rfl: 2    escitalopram (LEXAPRO) 20 mg tablet, Take 1 tablet (20 mg total) by mouth daily, Disp: 100 tablet, Rfl: 1    hydroCHLOROthiazide 25 mg tablet, Take 1 tablet (25 mg total) by mouth daily, Disp: 90 tablet, Rfl: 1    lisinopril (ZESTRIL) 40 mg tablet, Take 1 tablet (40 mg total) by mouth daily, Disp: 90 tablet, Rfl: 1    metoprolol succinate (TOPROL-XL) 50 mg 24 hr tablet, Take 1 tablet (50 mg total) by mouth daily, Disp: 90 tablet, Rfl: 1    Zepbound 2.5 MG/0.5ML auto-injector, Inject 0.5 mL (2.5 mg total) under the skin once a week, Disp: 2 mL, Rfl: 0    ALLERGIES:  No Known Allergies        REVIEW OF SYSTEMS:  Review of Systems   Constitutional:  Negative for chills and fever.   HENT:  Negative for ear pain and sore throat.    Eyes:  Negative for pain and visual disturbance.    Respiratory:  Negative for cough and shortness of breath.    Cardiovascular:  Negative for chest pain and palpitations.   Gastrointestinal:  Negative for abdominal pain and vomiting.   Genitourinary:  Negative for dysuria and hematuria.   Musculoskeletal:  Negative for arthralgias and back pain.   Skin:  Negative for color change and rash.   Neurological:  Positive for numbness. Negative for seizures and syncope.   All other systems reviewed and are negative.      VITALS:  There were no vitals filed for this visit.    LABS:  HgA1c:   Lab Results   Component Value Date    HGBA1C 5.7 (H) 12/10/2024     BMP:   Lab Results   Component Value Date    GLUCOSE 105 05/31/2015    CALCIUM 8.7 05/31/2015     (L) 05/31/2015    K 4.9 12/10/2024    CO2 21 12/10/2024     12/10/2024    BUN 18 12/10/2024    CREATININE 0.97 12/10/2024       _____________________________________________________  PHYSICAL EXAMINATION:  General: well developed and well nourished, alert, oriented times 3, and appears comfortable  Psychiatric: Normal  HEENT: Normocephalic, Atraumatic Trachea Midline, No torticollis  Pulmonary: No audible wheezing or respiratory distress   Abdomen/GI: Non tender, non distended   Cardiovascular: No pitting edema, 2+ radial pulse   Skin: No masses, erythema, lacerations, fluctation, ulcerations  Neurovascular: Sensation Intact to the Median, Ulnar, Radial Nerve, Motor Intact to the Median, Ulnar, Radial Nerve, and Pulses Intact  Musculoskeletal: Normal, except as noted in detailed exam and in HPI.      MUSCULOSKELETAL EXAMINATION:  Right hand:  SILT  Composite fist    Positive elbow flexion compression test      2 point discrimination:   Thumb 5 mm  Index 5 mm  Middle 5 mm  Ring 5 mm  Small 5 mm      Left hand:  SILT  Composite fist    Negative elbow flexion compression test    2 point discrimination:   Thumb 5 mm  Index 5 mm  Middle 5 mm  Ring 5 mm  Small 5  mm    ___________________________________________________  STUDIES REVIEWED:    No new images obtained/reviewed      PROCEDURES PERFORMED:  Procedures  No Procedures performed today    _____________________________________________________      Scribe Attestation      I,:  Kel Elizabeth am acting as a scribe while in the presence of the attending physician.:       I,:  Doyle Draper MD personally performed the services described in this documentation    as scribed in my presence.:

## 2025-01-07 NOTE — TELEPHONE ENCOUNTER
Reason for call:   [] Prior Auth  [] Other:     Caller:  [x] Patient  [] Pharmacy  Name:   Address:   Callback Number:     Medication: Zepbound 2.5 MG/0.5ML auto-injector     Dose/Frequency: Inject 0.5 mL (2.5 mg total) under the skin once a week     Quantity: 2 ml    Ordering Provider:   [x] PCP/Provider -   [] Speciality/Provider -     Has the patient tried other medications and failed? If failed, which medications did they fail?    [x] No   [] Yes -     Is the patient's insurance updated in EPIC?   [] Yes   [] No     Is a copy of the patient's insurance scanned in EPIC?   [x] Yes   [] No

## 2025-01-07 NOTE — ASSESSMENT & PLAN NOTE
Weight loss would aid in ANTWAN treatment  Orders:    Zepbound 2.5 MG/0.5ML auto-injector; Inject 0.5 mL (2.5 mg total) under the skin once a week

## 2025-01-07 NOTE — ASSESSMENT & PLAN NOTE
Given morbid obesity with other co-morbidities I feel a weight loss medication would be beneficial.     Pt has no h/o MTC, MENS2, pancreatitis.   We discussed risks and AE such as nausea, vomiting and change in bowel function,palpitations and mood change.   They are aware to call with any RUQ pain or swelling or pain in neck.     We discussed the importance of lifestyle changes in addition to weight loss medication to achieve weight loss goals.           Orders:    Zepbound 2.5 MG/0.5ML auto-injector; Inject 0.5 mL (2.5 mg total) under the skin once a week    Comprehensive metabolic panel (3 months); Future

## 2025-01-07 NOTE — TELEPHONE ENCOUNTER
PA for Zepbound 2.5 MG/0.5ML auto-injector SUBMITTED to [facility]    via    [x]CMM-KEY: [id number]  []Surescripts-Case ID #   []Availity-Auth ID # NDC #   []Faxed to plan   []Other website   []Phone call Case ID #     [x]PA sent as URGENT    All office notes, labs and other pertaining documents and studies sent. Clinical questions answered. Awaiting determination from insurance company.     Turnaround time for your insurance to make a decision on your Prior Authorization can take 7-21 business days.

## 2025-01-07 NOTE — PROGRESS NOTES
Name: Nicolas Mari      : 1975      MRN: [id number]  Encounter Provider: SUKH Hernandez  Encounter Date: 2025   Encounter department: [facility] [address]   :  Assessment & Plan  Morbid obesity with BMI of 45.0-49.9, adult (HCC)  Given morbid obesity with other co-morbidities I feel a weight loss medication would be beneficial.     Pt has no h/o MTC, MENS2, pancreatitis.   We discussed risks and AE such as nausea, vomiting and change in bowel function,palpitations and mood change.   They are aware to call with any RUQ pain or swelling or pain in neck.     We discussed the importance of lifestyle changes in addition to weight loss medication to achieve weight loss goals.           Orders:    Zepbound 2.5 MG/0.5ML auto-injector; Inject 0.5 mL (2.5 mg total) under the skin once a week    Comprehensive metabolic panel (3 months); Future    Primary hypertension  BP has been difficult to control.   Weight loss would have significant impact and weight loss med will aid in this.   Orders:    Zepbound 2.5 MG/0.5ML auto-injector; Inject 0.5 mL (2.5 mg total) under the skin once a week    ANTWAN (obstructive sleep apnea)  Weight loss would aid in ANTWAN treatment  Orders:    Zepbound 2.5 MG/0.5ML auto-injector; Inject 0.5 mL (2.5 mg total) under the skin once a week           History of Present Illness     Pt would like to try Zepbound. His girlfriend has had good results. He is hoping this can get him motivated to lose weight. Currently not successful in losing weight.   He has HTN which has been difficult to control on 3 agents. He also has ANTWAN on Cpap. He is prediabetic with hyperlipidemia.   He denies personal or family h/o thyroid cancer. He denies h/o pancreatitis.       Review of Systems   Constitutional:  Negative for unexpected weight change.   Eyes:  Negative for visual disturbance.   Respiratory:  Negative for shortness of breath.    Cardiovascular:  Negative for chest pain,  "palpitations and leg swelling.   Neurological:  Negative for dizziness, weakness, light-headedness, numbness and headaches.       Objective   /82 (Patient Position: Sitting, Cuff Size: Standard)   Pulse 72   Temp 98.2 °F (36.8 °C) (Tympanic)   Ht 5' 10\" (1.778 m)   Wt (!) 156 kg (345 lb)   SpO2 98%   BMI 49.50 kg/m²      Physical Exam  Vitals reviewed.   Constitutional:       Appearance: Normal appearance. He is well-developed. He is obese.   Cardiovascular:      Rate and Rhythm: Normal rate and regular rhythm.      Pulses:           Carotid pulses are 2+ on the right side and 2+ on the left side.     Heart sounds: Normal heart sounds. No murmur heard.  Pulmonary:      Effort: Pulmonary effort is normal.      Breath sounds: Normal breath sounds.   Skin:     General: Skin is warm and dry.   Neurological:      Mental Status: He is alert and oriented to person, place, and time.   Psychiatric:         Mood and Affect: Mood normal.         Behavior: Behavior normal.         Thought Content: Thought content normal.         Judgment: Judgment normal.       Administrative Statements   I have spent a total time of 20 minutes in caring for this patient on the day of the visit/encounter including Risks and benefits of tx options, Instructions for management, Documenting in the medical record, Reviewing / ordering tests, medicine, procedures  , and Obtaining or reviewing history  .   "

## 2025-01-07 NOTE — ASSESSMENT & PLAN NOTE
BP has been difficult to control.   Weight loss would have significant impact and weight loss med will aid in this.   Orders:    Zepbound 2.5 MG/0.5ML auto-injector; Inject 0.5 mL (2.5 mg total) under the skin once a week

## 2025-01-08 ENCOUNTER — TELEPHONE (OUTPATIENT)
Age: 50
End: 2025-01-08

## 2025-01-16 DIAGNOSIS — F41.9 ANXIETY: ICD-10-CM

## 2025-01-16 RX ORDER — ESCITALOPRAM OXALATE 20 MG/1
20 TABLET ORAL DAILY
Qty: 90 TABLET | Refills: 1 | Status: SHIPPED | OUTPATIENT
Start: 2025-01-16

## 2025-01-27 DIAGNOSIS — I10 PRIMARY HYPERTENSION: ICD-10-CM

## 2025-01-27 DIAGNOSIS — E66.01 MORBID OBESITY WITH BMI OF 45.0-49.9, ADULT (HCC): ICD-10-CM

## 2025-01-27 DIAGNOSIS — G47.33 OSA (OBSTRUCTIVE SLEEP APNEA): ICD-10-CM

## 2025-01-27 RX ORDER — TIRZEPATIDE 2.5 MG/.5ML
2.5 INJECTION, SOLUTION SUBCUTANEOUS WEEKLY
Qty: 2 ML | Refills: 0 | Status: SHIPPED | OUTPATIENT
Start: 2025-01-27

## 2025-01-29 ENCOUNTER — HOSPITAL ENCOUNTER (OUTPATIENT)
Dept: ULTRASOUND IMAGING | Facility: HOSPITAL | Age: 50
Discharge: HOME | End: 2025-01-29
Attending: SURGERY
Payer: COMMERCIAL

## 2025-01-29 DIAGNOSIS — M79.641 RIGHT HAND PAIN: ICD-10-CM

## 2025-01-29 DIAGNOSIS — R20.0 NUMBNESS AND TINGLING IN RIGHT HAND: ICD-10-CM

## 2025-01-29 DIAGNOSIS — M79.642 PAIN IN LEFT HAND: ICD-10-CM

## 2025-01-29 DIAGNOSIS — R20.2 NUMBNESS AND TINGLING IN RIGHT HAND: ICD-10-CM

## 2025-01-29 DIAGNOSIS — R20.0 NUMBNESS AND TINGLING OF LEFT HAND: ICD-10-CM

## 2025-01-29 DIAGNOSIS — R20.2 NUMBNESS AND TINGLING OF LEFT HAND: ICD-10-CM

## 2025-01-29 PROCEDURE — 76882 US LMTD JT/FCL EVL NVASC XTR: CPT

## 2025-02-13 ENCOUNTER — OFFICE VISIT (OUTPATIENT)
Dept: FAMILY MEDICINE CLINIC | Facility: HOSPITAL | Age: 50
End: 2025-02-13
Payer: COMMERCIAL

## 2025-02-13 VITALS
SYSTOLIC BLOOD PRESSURE: 131 MMHG | WEIGHT: 315 LBS | DIASTOLIC BLOOD PRESSURE: 71 MMHG | BODY MASS INDEX: 45.1 KG/M2 | HEART RATE: 64 BPM | TEMPERATURE: 99 F | HEIGHT: 70 IN | OXYGEN SATURATION: 98 %

## 2025-02-13 DIAGNOSIS — R10.32 LEFT INGUINAL PAIN: Primary | ICD-10-CM

## 2025-02-13 DIAGNOSIS — M25.512 ACUTE PAIN OF LEFT SHOULDER: ICD-10-CM

## 2025-02-13 DIAGNOSIS — I10 PRIMARY HYPERTENSION: ICD-10-CM

## 2025-02-13 PROCEDURE — 99214 OFFICE O/P EST MOD 30 MIN: CPT | Performed by: NURSE PRACTITIONER

## 2025-02-13 NOTE — ASSESSMENT & PLAN NOTE
Bp above goal but home readings have been controlled.   Home reading from this morning documented.

## 2025-02-13 NOTE — PROGRESS NOTES
"Name: Nicolas Mari      : 1975      MRN: [id number]  Encounter Provider: SUKH Hernandez  Encounter Date: 2025   Encounter department: [facility] [address]   :  Assessment & Plan  Left inguinal pain  Possible hernia vs groin muscle pull.   Check US to r/o hernia. If negative then ortho vs PT.   Orders:    US groin/inguinal area; Future    Acute pain of left shoulder  Acute on chronic.   Shoulder pain for a few months after lifting pool cover off. This intensified after lifting mother off of floor.   Given two injuries will refer to ortho.   Orders:    Ambulatory Referral to Orthopedic Surgery; Future    Primary hypertension  Bp above goal but home readings have been controlled.   Home reading from this morning documented.               History of Present Illness   Mom fell and he hurt himself after he picked her up. Left groin pain after. Occurred 1 month ago. Also with left  shoulder pain. This pain has been for months but worse after lifting her. Groin pain with moving. Hurts to lift leg. Bending over causes pain. Pain is not improving. Has gotten worse in the last week. No lump in area.     Left shoulder pain located anterior shoulder. Pain is constant. Worse with movement. Raising arm up makes pain worse. No N/T. Initial pain started after lifting pool cover.     BP at home a has been really good. Last took this morning.       Review of Systems   Constitutional:  Negative for chills and fever.   Gastrointestinal:  Negative for abdominal pain.        Left groin pain   Musculoskeletal:  Positive for arthralgias. Negative for joint swelling.   Neurological:  Negative for weakness and numbness.       Objective   /71 Comment: home reading on 25  Pulse 64   Temp 99 °F (37.2 °C) (Tympanic)   Ht 5' 10\" (1.778 m)   Wt (!) 156 kg (345 lb)   SpO2 98%   BMI 49.50 kg/m²      Physical Exam  Vitals reviewed.   Constitutional:       Appearance: Normal appearance. "   Cardiovascular:      Rate and Rhythm: Normal rate and regular rhythm.      Heart sounds: Normal heart sounds.   Pulmonary:      Effort: Pulmonary effort is normal.      Breath sounds: Normal breath sounds.   Abdominal:      Hernia: There is no hernia in the left femoral area.   Musculoskeletal:      Left shoulder: No swelling, deformity, tenderness, bony tenderness or crepitus. Decreased range of motion (Limited due to pain with abduction, flexion). Normal strength. Normal pulse.   Skin:     General: Skin is warm and dry.   Neurological:      Mental Status: He is alert and oriented to person, place, and time.   Psychiatric:         Mood and Affect: Mood normal.         Behavior: Behavior normal.         Thought Content: Thought content normal.         Judgment: Judgment normal.

## 2025-02-17 DIAGNOSIS — E66.01 MORBID OBESITY WITH BMI OF 45.0-49.9, ADULT (HCC): Primary | ICD-10-CM

## 2025-02-17 RX ORDER — TIRZEPATIDE 5 MG/.5ML
5 INJECTION, SOLUTION SUBCUTANEOUS WEEKLY
Qty: 2 ML | Refills: 0 | Status: SHIPPED | OUTPATIENT
Start: 2025-02-17

## 2025-02-18 ENCOUNTER — OFFICE VISIT (OUTPATIENT)
Dept: OBGYN CLINIC | Facility: CLINIC | Age: 50
End: 2025-02-18
Payer: COMMERCIAL

## 2025-02-18 ENCOUNTER — PATIENT MESSAGE (OUTPATIENT)
Dept: OBGYN CLINIC | Facility: CLINIC | Age: 50
End: 2025-02-18

## 2025-02-18 DIAGNOSIS — G56.23 CUBITAL TUNNEL SYNDROME, BILATERAL: ICD-10-CM

## 2025-02-18 DIAGNOSIS — G56.03 CARPAL TUNNEL SYNDROME, BILATERAL: Primary | ICD-10-CM

## 2025-02-18 PROCEDURE — 99213 OFFICE O/P EST LOW 20 MIN: CPT | Performed by: SURGERY

## 2025-02-18 NOTE — PROGRESS NOTES
HPI:  49M here for follow-up.  Today he states overall he is doing well.  He has been using the brace on the right side at nighttime which helps significantly however once he removes the brace in the morning, his symptoms return.  His right side is much more symptomatic than the left.  He denies any hand or  strength weakness.      PE:  Bilateral UE: No open wounds or erythema.  No ecchymosis or swelling.  No muscle wasting.  Normal elbow wrist range of motion, full composite fist.  2-pt discrimination testing:    Right and left hand  Thumb 5 mm  Index 5 mm  Middle 5 mm  Ring 5 mm  Small 5 mm      A/P:  Bilateral carpal and cubital tunnel syndrome  Patient is a candidate for carpal and cubital tunnel release surgery.  He would like to check his schedule prior to booking surgery.  Will likely start with the right side.  He was given information to call our surgical scheduler when he is ready to proceed.  He would not need another office visit for this.  Recommend continuing consistent nighttime bracing until the potential surgery date.  All questions answered.

## 2025-02-19 ENCOUNTER — HOSPITAL ENCOUNTER (OUTPATIENT)
Dept: ULTRASOUND IMAGING | Facility: HOSPITAL | Age: 50
Discharge: HOME | End: 2025-02-19
Payer: COMMERCIAL

## 2025-02-19 DIAGNOSIS — R10.32 LEFT INGUINAL PAIN: ICD-10-CM

## 2025-02-19 PROCEDURE — 76705 ECHO EXAM OF ABDOMEN: CPT

## 2025-02-19 NOTE — PATIENT COMMUNICATION
Left a message with the CPT codes and made the patient aware that he is will need to reach out to his insurance company. They will be able to give him the price for each arm.

## 2025-02-25 ENCOUNTER — RESULTS FOLLOW-UP (OUTPATIENT)
Dept: FAMILY MEDICINE CLINIC | Facility: HOSPITAL | Age: 50
End: 2025-02-25

## 2025-03-04 DIAGNOSIS — I10 ESSENTIAL HYPERTENSION: ICD-10-CM

## 2025-03-04 DIAGNOSIS — I10 UNCONTROLLED HYPERTENSION: ICD-10-CM

## 2025-03-05 RX ORDER — LISINOPRIL 40 MG/1
40 TABLET ORAL DAILY
Qty: 90 TABLET | Refills: 1 | Status: SHIPPED | OUTPATIENT
Start: 2025-03-05

## 2025-03-05 RX ORDER — METOPROLOL SUCCINATE 50 MG/1
50 TABLET, EXTENDED RELEASE ORAL DAILY
Qty: 90 TABLET | Refills: 1 | Status: SHIPPED | OUTPATIENT
Start: 2025-03-05

## 2025-03-05 RX ORDER — AMLODIPINE BESYLATE 10 MG/1
10 TABLET ORAL DAILY
Qty: 90 TABLET | Refills: 1 | Status: SHIPPED | OUTPATIENT
Start: 2025-03-05

## 2025-03-05 RX ORDER — HYDROCHLOROTHIAZIDE 25 MG/1
25 TABLET ORAL DAILY
Qty: 90 TABLET | Refills: 1 | Status: SHIPPED | OUTPATIENT
Start: 2025-03-05

## 2025-03-17 DIAGNOSIS — G47.33 OSA (OBSTRUCTIVE SLEEP APNEA): ICD-10-CM

## 2025-03-17 DIAGNOSIS — E66.01 MORBID OBESITY WITH BMI OF 45.0-49.9, ADULT (HCC): Primary | ICD-10-CM

## 2025-03-17 RX ORDER — TIRZEPATIDE 7.5 MG/.5ML
7.5 INJECTION, SOLUTION SUBCUTANEOUS WEEKLY
Qty: 2 ML | Refills: 0 | Status: SHIPPED | OUTPATIENT
Start: 2025-03-17

## 2025-03-20 ENCOUNTER — TELEPHONE (OUTPATIENT)
Dept: SLEEP CENTER | Facility: CLINIC | Age: 50
End: 2025-03-20

## 2025-03-20 NOTE — TELEPHONE ENCOUNTER
Confirmation of order form for CPAP supplies received via fax from [employer]    Form placed in Dr. Ayoub's inbox in [facility] to complete.    Completed form to be faxed to [phone].

## 2025-03-21 ENCOUNTER — TELEPHONE (OUTPATIENT)
Dept: OBGYN CLINIC | Facility: CLINIC | Age: 50
End: 2025-03-21

## 2025-04-07 LAB
ALBUMIN SERPL-MCNC: 4.5 G/DL (ref 4.1–5.1)
ALP SERPL-CCNC: 56 IU/L (ref 44–121)
ALT SERPL-CCNC: 22 IU/L (ref 0–44)
AST SERPL-CCNC: 18 IU/L (ref 0–40)
BILIRUB SERPL-MCNC: 0.5 MG/DL (ref 0–1.2)
BUN SERPL-MCNC: 16 MG/DL (ref 6–24)
BUN/CREAT SERPL: 16 (ref 9–20)
CALCIUM SERPL-MCNC: 9.9 MG/DL (ref 8.7–10.2)
CHLORIDE SERPL-SCNC: 101 MMOL/L (ref 96–106)
CO2 SERPL-SCNC: 22 MMOL/L (ref 20–29)
CREAT SERPL-MCNC: 0.98 MG/DL (ref 0.76–1.27)
EGFR: 94 ML/MIN/1.73
GLOBULIN SER-MCNC: 2.7 G/DL (ref 1.5–4.5)
GLUCOSE SERPL-MCNC: 130 MG/DL (ref 70–99)
POTASSIUM SERPL-SCNC: 4.9 MMOL/L (ref 3.5–5.2)
PROT SERPL-MCNC: 7.2 G/DL (ref 6–8.5)
SODIUM SERPL-SCNC: 140 MMOL/L (ref 134–144)

## 2025-04-08 ENCOUNTER — RESULTS FOLLOW-UP (OUTPATIENT)
Dept: FAMILY MEDICINE CLINIC | Facility: HOSPITAL | Age: 50
End: 2025-04-08

## 2025-04-14 DIAGNOSIS — E66.01 MORBID OBESITY WITH BMI OF 45.0-49.9, ADULT (HCC): Primary | ICD-10-CM

## 2025-04-14 RX ORDER — TIRZEPATIDE 10 MG/.5ML
10 INJECTION, SOLUTION SUBCUTANEOUS WEEKLY
Qty: 2 ML | Refills: 0 | Status: SHIPPED | OUTPATIENT
Start: 2025-04-14

## 2025-04-15 ENCOUNTER — TELEPHONE (OUTPATIENT)
Age: 50
End: 2025-04-15

## 2025-04-15 NOTE — TELEPHONE ENCOUNTER
Up-to-date vitals, ht/wt/BMI, are needed to submit PA for Zepbound 10mg. Sending OV notes from 2/18/25 may result in a denial due to insurance needing to know if pt has lost 5% weight from baseline.

## 2025-04-15 NOTE — TELEPHONE ENCOUNTER
Reason for call:   [x] Prior Auth  [] Other:     Caller:  [x] Patient  [] Pharmacy  Name:   Address:   Callback Number:     Medication: tirzepatide (Zepbound) 10 mg/0.5 mL auto-injector    Dose/Frequency: Inject 0.5 mL (10 mg total) under the skin once a week     Quantity: 2mL    Ordering Provider:   [x] PCP/Provider -   [] Speciality/Provider -     Has the patient tried other medications and failed? If failed, which medications did they fail?    [x] No - ongoing therapy  [] Yes -     Is the patient's insurance updated in EPIC?   [x] Yes   [] No     Is a copy of the patient's insurance scanned in EPIC?   [x] Yes   [] No

## 2025-04-16 NOTE — TELEPHONE ENCOUNTER
PA for (Zepbound) 10 mg/0.5 mL auto-injector DENIED    Reason:(Screenshot if applicable)        Message sent to office clinical pool Yes    Denial letter scanned into Media Yes    Appeal started No (Provider will need to decide if appeal is warranted and send clinical documentation to Prior Authorization Team for initiation.)    **Please follow up with your patient regarding denial and next steps**

## 2025-04-16 NOTE — TELEPHONE ENCOUNTER
PA for (Zepbound) 10 mg/0.5 mL auto-injector SUBMITTED to [facility]    via    []CMM-KEY:   [x]Surescripts-Case ID # [id number]   []Availity-Auth ID # NDC #   []Faxed to plan   []Other website   []Phone call Case ID #     [x]PA sent as URGENT    All office notes, labs and other pertaining documents and studies sent. Clinical questions answered. Awaiting determination from insurance company.     Turnaround time for your insurance to make a decision on your Prior Authorization can take 7-21 business days.

## 2025-04-18 ENCOUNTER — TELEPHONE (OUTPATIENT)
Age: 50
End: 2025-04-18

## 2025-04-18 ENCOUNTER — NURSE TRIAGE (OUTPATIENT)
Age: 50
End: 2025-04-18

## 2025-04-18 DIAGNOSIS — E66.813 CLASS 3 SEVERE OBESITY WITH SERIOUS COMORBIDITY AND BODY MASS INDEX (BMI) OF 45.0 TO 49.9 IN ADULT, UNSPECIFIED OBESITY TYPE: ICD-10-CM

## 2025-04-18 DIAGNOSIS — E66.813 CLASS 3 SEVERE OBESITY WITH SERIOUS COMORBIDITY AND BODY MASS INDEX (BMI) OF 45.0 TO 49.9 IN ADULT, UNSPECIFIED OBESITY TYPE: Primary | ICD-10-CM

## 2025-04-18 RX ORDER — TIRZEPATIDE 7.5 MG/.5ML
7.5 INJECTION, SOLUTION SUBCUTANEOUS WEEKLY
Qty: 2 ML | Refills: 0 | Status: SHIPPED | OUTPATIENT
Start: 2025-04-18 | End: 2025-04-18 | Stop reason: CLARIF

## 2025-04-18 RX ORDER — TIRZEPATIDE 7.5 MG/.5ML
7.5 INJECTION, SOLUTION SUBCUTANEOUS WEEKLY
Qty: 2 ML | Refills: 0 | Status: SHIPPED | OUTPATIENT
Start: 2025-04-18

## 2025-04-18 NOTE — TELEPHONE ENCOUNTER
Zepbound was denied due to patient not losing 5% of body weight. Please see telephone encounter on 4/15/25. This applies to all doses. Insurance wants to see a 5% body weight loss from start of medication to current.

## 2025-04-18 NOTE — TELEPHONE ENCOUNTER
"Reason for Disposition   Prescription prescribed recently is not at pharmacy and triager has access to patient's EMR and prescription is recorded in the EMR    Answer Assessment - Initial Assessment Questions  1. NAME of MEDICINE: \"What medicine(s) are you calling about?\"      Zepbound 7.5mg  2. QUESTION: \"What is your question?\" (e.g., double dose of medicine, side effect)      Requesting sent to [facility]  3. PRESCRIBER: \"Who prescribed the medicine?\" Reason: if prescribed by specialist, call should be referred to that group.      PCP  4. SYMPTOMS: \"Do you have any symptoms?\" If Yes, ask: \"What symptoms are you having?\"  \"How bad are the symptoms (e.g., mild, moderate, severe)      N/A  5. PREGNANCY:  \"Is there any chance that you are pregnant?\" \"When was your last menstrual period?\"      N/A    Protocols used: Medication Question Call-Adult-[place]    "

## 2025-04-18 NOTE — TELEPHONE ENCOUNTER
Reason for call:   [x] Prior Auth  [] Other:     Caller:  [x] Patient  [] Pharmacy  Name:   Address:   Callback Number:     Medication: tirzepatide (Zepbound) 7.5 mg/0.5 mL auto-injector    Dose/Frequency: Inject 0.5 mL (7.5 mg total) under the skin once a week    Quantity: 2mL    Ordering Provider:   [x] PCP/Provider -   [] Speciality/Provider -     Has the patient tried other medications and failed? If failed, which medications did they fail?    [x] No - ongoing therapy  [] Yes -     Is the patient's insurance updated in EPIC?   [x] Yes   [] No     Is a copy of the patient's insurance scanned in EPIC?   [x] Yes   [] No

## 2025-04-21 NOTE — TELEPHONE ENCOUNTER
Zepbound was denied stating lack weight loss. I am unsure what they are basing this off of. Pt started in February and he has not had a f/u to determine his weight loss. He has appointment 4/28/25. It seems unfair to deny medication without time.

## 2025-04-29 ENCOUNTER — OFFICE VISIT (OUTPATIENT)
Dept: FAMILY MEDICINE CLINIC | Facility: HOSPITAL | Age: 50
End: 2025-04-29
Payer: COMMERCIAL

## 2025-04-29 ENCOUNTER — TELEPHONE (OUTPATIENT)
Age: 50
End: 2025-04-29

## 2025-04-29 VITALS
WEIGHT: 315 LBS | SYSTOLIC BLOOD PRESSURE: 139 MMHG | TEMPERATURE: 98.2 F | DIASTOLIC BLOOD PRESSURE: 80 MMHG | HEIGHT: 70 IN | BODY MASS INDEX: 45.1 KG/M2 | HEART RATE: 74 BPM | OXYGEN SATURATION: 99 %

## 2025-04-29 DIAGNOSIS — I10 PRIMARY HYPERTENSION: ICD-10-CM

## 2025-04-29 DIAGNOSIS — R73.03 PREDIABETES: ICD-10-CM

## 2025-04-29 DIAGNOSIS — F41.9 ANXIETY: ICD-10-CM

## 2025-04-29 DIAGNOSIS — E66.01 MORBID OBESITY WITH BMI OF 45.0-49.9, ADULT (HCC): Primary | ICD-10-CM

## 2025-04-29 DIAGNOSIS — Z00.00 ANNUAL PHYSICAL EXAM: ICD-10-CM

## 2025-04-29 PROCEDURE — 99214 OFFICE O/P EST MOD 30 MIN: CPT | Performed by: NURSE PRACTITIONER

## 2025-04-29 PROCEDURE — 99396 PREV VISIT EST AGE 40-64: CPT | Performed by: NURSE PRACTITIONER

## 2025-04-29 RX ORDER — TIRZEPATIDE 10 MG/.5ML
10 INJECTION, SOLUTION SUBCUTANEOUS WEEKLY
Qty: 2 ML | Refills: 0 | Status: SHIPPED | OUTPATIENT
Start: 2025-04-29

## 2025-04-29 NOTE — ASSESSMENT & PLAN NOTE
There were issues with insurance not authorizing 10 mg Zepbound due to no weight loss documented.   At the time of the denial, he had not yeat done his 3 month f/u.   He has lost about 11 pounds since starting.   He has tolerated Zepbound up until now.   He would benefit from 10 mg dose so this was prescribed today.   He continues to benefit from GLP-1 given multiple co-morbidities including ANTWAN, prediabtees, HTN and HL.     Plan to f/u in 3 months.   Orders:    tirzepatide (Zepbound) 10 mg/0.5 mL auto-injector; Inject 0.5 mL (10 mg total) under the skin once a week

## 2025-04-29 NOTE — ASSESSMENT & PLAN NOTE
Home readings are well controlled.   No change in current regimen.   I am hopeful as he continues to lose silver with GLP-1 then BP will continue to improve.

## 2025-04-29 NOTE — PROGRESS NOTES
Adult Annual Physical  Name: Nicolas Mari      : 1975      MRN: [id number]  Encounter Provider: SUKH Hernandez  Encounter Date: 2025   Encounter department: [facility] [address]     :  Assessment & Plan  Morbid obesity with BMI of 45.0-49.9, adult (HCC)      There were issues with insurance not authorizing 10 mg Zepbound due to no weight loss documented.   At the time of the denial, he had not yeat done his 3 month f/u.   He has lost about 11 pounds since starting.   He has tolerated Zepbound up until now.   He would benefit from 10 mg dose so this was prescribed today.   He continues to benefit from GLP-1 given multiple co-morbidities including ANTWAN, prediabtees, HTN and HL.     Plan to f/u in 3 months.   Orders:    tirzepatide (Zepbound) 10 mg/0.5 mL auto-injector; Inject 0.5 mL (10 mg total) under the skin once a week    Anxiety  Well controlled.   Continue on current regimen.        Primary hypertension  Home readings are well controlled.   No change in current regimen.   I am hopeful as he continues to lose silver with GLP-1 then BP will continue to improve.          Prediabetes  Last A1C was 5.7  Recheck in 3 months.   Reprinted lab slip       Annual physical exam             Preventive Screenings:  - Diabetes Screening: screening up-to-date  - Cholesterol Screening: screening not indicated and has hyperlipidemia   - Hepatitis C screening: screening up-to-date   - HIV screening: screening up-to-date   - Colon cancer screening: screening up-to-date   - Lung cancer screening: screening not indicated     Immunizations:  - Immunizations due: Zoster (Shingrix)  - The patient declines recommended vaccines currently despite my recommendations      Counseling/Anticipatory Guidance:    - Diet: discussed recommendations for a healthy/well-balanced diet.   - Exercise: the importance of regular exercise/physical activity was discussed. Recommend exercise 3-5 times per week for  "at least 30 minutes.   - Injury prevention: discussed safety/seat belts, safety helmets, smoke detectors, carbon monoxide detectors, and smoking near bedding or upholstery.          History of Present Illness     Adult Annual Physical:  Patient presents for annual physical. Issues with getting 10 mg Zepbound. Insurance denied due to lack of data r/t weight loss. Pt had not yet followed up since initiation. He has lost 11 pounds since starting. Notes decreased appetite. He reports he continues to eat healthy and exercising.  He denies any AE. He has been off Zepbound for 1 week.   Checking BP at home. Runs mostly 120-130s/70-80s. Rarely above 140. .     Diet and Physical Activity:  - Diet/Nutrition: no special diet.  - Exercise: walking, 3-4 times a week on average and less than 30 minutes on average.    Depression Screening:  - PHQ-2 Score: 0    General Health:  - Sleep: sleeps well, 7-8 hours of sleep on average and uses CPAP machine.  - Hearing: normal hearing bilateral ears.  - Vision: no vision problems.  - Dental: regular dental visits, no dental visits for > 1 year, brushes teeth twice daily and floss regularly.     Health:    - Urinary symptoms: none.     Advanced Care Planning:  - Has an advanced directive?: yes      Review of Systems   Constitutional: Negative.    HENT: Negative.     Eyes: Negative.    Respiratory: Negative.     Cardiovascular: Negative.    Gastrointestinal: Negative.    Endocrine: Negative.    Genitourinary: Negative.    Musculoskeletal: Negative.    Skin: Negative.    Neurological: Negative.    Hematological: Negative.    Psychiatric/Behavioral: Negative.           Objective   /76 (Patient Position: Sitting, Cuff Size: Standard)   Pulse 74   Temp 98.2 °F (36.8 °C) (Tympanic)   Ht 5' 10\" (1.778 m)   Wt (!) 152 kg (334 lb)   SpO2 99%   BMI 47.92 kg/m²     Physical Exam  Vitals reviewed.   Constitutional:       Appearance: Normal appearance. He is obese.   HENT:      Head: " Normocephalic and atraumatic.      Right Ear: Tympanic membrane, ear canal and external ear normal.      Left Ear: Tympanic membrane, ear canal and external ear normal.      Nose: Nose normal.      Mouth/Throat:      Mouth: Mucous membranes are moist.      Pharynx: Oropharynx is clear.   Eyes:      Conjunctiva/sclera: Conjunctivae normal.      Pupils: Pupils are equal, round, and reactive to light.   Neck:      Thyroid: No thyromegaly.   Cardiovascular:      Rate and Rhythm: Normal rate and regular rhythm.      Heart sounds: Normal heart sounds. No murmur heard.  Pulmonary:      Effort: Pulmonary effort is normal.      Breath sounds: Normal breath sounds.   Abdominal:      General: Abdomen is flat. Bowel sounds are normal.      Palpations: Abdomen is soft. There is no hepatomegaly or splenomegaly.      Tenderness: There is no abdominal tenderness.   Musculoskeletal:         General: Normal range of motion.      Cervical back: Normal range of motion and neck supple.   Skin:     General: Skin is warm and dry.      Capillary Refill: Capillary refill takes less than 2 seconds.   Neurological:      General: No focal deficit present.      Mental Status: He is alert and oriented to person, place, and time.   Psychiatric:         Mood and Affect: Mood normal.         Behavior: Behavior normal.         Thought Content: Thought content normal.         Judgment: Judgment normal.

## 2025-04-29 NOTE — PATIENT INSTRUCTIONS
"Patient Education     Routine physical for adults   The Basics   Written by the doctors and editors at [facility]   What is a physical? -- A physical is a routine visit, or \"check-up,\" with your doctor. You might also hear it called a \"wellness visit\" or \"preventive visit.\"  During each visit, the doctor will:   Ask about your physical and mental health   Ask about your habits, behaviors, and lifestyle   Do an exam   Give you vaccines if needed   Talk to you about any medicines you take   Give advice about your health   Answer your questions  Getting regular check-ups is an important part of taking care of your health. It can help your doctor find and treat any problems you have. But it's also important for preventing health problems.  A routine physical is different from a \"sick visit.\" A sick visit is when you see a doctor because of a health concern or problem. Since physicals are scheduled ahead of time, you can think about what you want to ask the doctor.  How often should I get a physical? -- It depends on your age and health. In general, for people age 21 years and older:   If you are younger than 50 years, you might be able to get a physical every 3 years.   If you are 50 years or older, your doctor might recommend a physical every year.  If you have an ongoing health condition, like diabetes or high blood pressure, your doctor will probably want to see you more often.  What happens during a physical? -- In general, each visit will include:   Physical exam - The doctor or nurse will check your height, weight, heart rate, and blood pressure. They will also look at your eyes and ears. They will ask about how you are feeling and whether you have any symptoms that bother you.   Medicines - It's a good idea to bring a list of all the medicines you take to each doctor visit. Your doctor will talk to you about your medicines and answer any questions. Tell them if you are having any side effects that bother you. You " "should also tell them if you are having trouble paying for any of your medicines.   Habits and behaviors - This includes:   Your diet   Your exercise habits   Whether you smoke, drink alcohol, or use drugs   Whether you are sexually active   Whether you feel safe at home  Your doctor will talk to you about things you can do to improve your health and lower your risk of health problems. They will also offer help and support. For example, if you want to quit smoking, they can give you advice and might prescribe medicines. If you want to improve your diet or get more physical activity, they can help you with this, too.   Lab tests, if needed - The tests you get will depend on your age and situation. For example, your doctor might want to check your:   Cholesterol   Blood sugar   Iron level   Vaccines - The recommended vaccines will depend on your age, health, and what vaccines you already had. Vaccines are very important because they can prevent certain serious or deadly infections.   Discussion of screening - \"Screening\" means checking for diseases or other health problems before they cause symptoms. Your doctor can recommend screening based on your age, risk, and preferences. This might include tests to check for:   Cancer, such as breast, prostate, cervical, ovarian, colorectal, prostate, lung, or skin cancer   Sexually transmitted infections, such as chlamydia and gonorrhea   Mental health conditions like depression and anxiety  Your doctor will talk to you about the different types of screening tests. They can help you decide which screenings to have. They can also explain what the results might mean.   Answering questions - The physical is a good time to ask the doctor or nurse questions about your health. If needed, they can refer you to other doctors or specialists, too.  Adults older than 65 years often need other care, too. As you get older, your doctor will talk to you about:   How to prevent falling at " home   Hearing or vision tests   Memory testing   How to take your medicines safely   Making sure that you have the help and support you need at home  All topics are updated as new evidence becomes available and our peer review process is complete.  This topic retrieved from [employer] on: May 02, 2024.  Topic 764831 Version 1.0  Release: 32.4.3 - C32.122  © 2024 UpToDate, Inc. and/or its affiliates. All rights reserved.  Consumer Information Use and Disclaimer   Disclaimer: This generalized information is a limited summary of diagnosis, treatment, and/or medication information. It is not meant to be comprehensive and should be used as a tool to help the user understand and/or assess potential diagnostic and treatment options. It does NOT include all information about conditions, treatments, medications, side effects, or risks that may apply to a specific patient. It is not intended to be medical advice or a substitute for the medical advice, diagnosis, or treatment of a health care provider based on the health care provider's examination and assessment of a patient's specific and unique circumstances. Patients must speak with a health care provider for complete information about their health, medical questions, and treatment options, including any risks or benefits regarding use of medications. This information does not endorse any treatments or medications as safe, effective, or approved for treating a specific patient. UpToDate, Inc. and its affiliates disclaim any warranty or liability relating to this information or the use thereof.The use of this information is governed by the Terms of Use, available at https://www.woltersArisokouwer.com/en/know/clinical-effectiveness-terms. 2024© UpToDate, Inc. and its affiliates and/or licensors. All rights reserved.  Copyright   © 2024 UpToDate, Inc. and/or its affiliates. All rights reserved.

## 2025-04-29 NOTE — TELEPHONE ENCOUNTER
PA for Zepbound 10mg SUBMITTED to [employer]     via    []CMM-KEY:   [x]Surescripts-Case ID #: [id number]   []Availity-Auth ID #  []Faxed to plan   []Other website   []Phone call Case ID #     [x]PA sent as URGENT    All office notes, labs and other pertaining documents and studies sent. Clinical questions answered. Awaiting determination from insurance company.     Turnaround time for your insurance to make a decision on your Prior Authorization can take 7-21 business days.

## 2025-04-30 NOTE — TELEPHONE ENCOUNTER
PA for Zepbound 10mg APPROVED     Date(s) approved April 29, 2025 to April 29, 2026     Case #: [id number]     Patient advised by      - Med was picked up on 4/29    []LabMindshart Message  []Phone call   []LMOM  []L/M to call office as no active Communication consent on file  []Unable to leave detailed message as VM not approved on Communication consent       Pharmacy advised by    []Fax  [x]Phone call- Paid claim received  []Secure Chat         Approval letter scanned into [employer] Yes

## 2025-05-19 ENCOUNTER — TELEPHONE (OUTPATIENT)
Age: 50
End: 2025-05-19

## 2025-05-19 DIAGNOSIS — E66.01 MORBID OBESITY WITH BMI OF 45.0-49.9, ADULT (HCC): Primary | ICD-10-CM

## 2025-05-19 DIAGNOSIS — G47.33 OSA (OBSTRUCTIVE SLEEP APNEA): ICD-10-CM

## 2025-05-19 RX ORDER — TIRZEPATIDE 12.5 MG/.5ML
12.5 INJECTION, SOLUTION SUBCUTANEOUS WEEKLY
Qty: 2 ML | Refills: 0 | Status: SHIPPED | OUTPATIENT
Start: 2025-05-19

## 2025-05-19 NOTE — TELEPHONE ENCOUNTER
PA for tirzepatide (Zepbound) 12.5 mg/0.5 mL auto-injector SUBMITTED to     via    []CMM-KEY:   [x]Surescripts-Case ID # [id number]   []Availity-Auth ID # NDC #   []Faxed to plan   []Other website   []Phone call Case ID #     [x]PA sent as URGENT    All office notes, labs and other pertaining documents and studies sent. Clinical questions answered. Awaiting determination from insurance company.     Turnaround time for your insurance to make a decision on your Prior Authorization can take 7-21 business days.

## 2025-05-20 NOTE — TELEPHONE ENCOUNTER
PA for (Zepbound) 12.5 mg/0.5 mL  APPROVED     Date(s) approved 5/19/25 - 12/31 2099    Case #[id number]     Patient advised by          []MyChart Message  [x]Phone call   []LMOM  []L/M to call office as no active Communication consent on file  []Unable to leave detailed message as VM not approved on Communication consent       Pharmacy advised by    [x]Fax  []Phone call  []Secure Chat    Specialty Pharmacy    []     Approval letter scanned into Media No

## 2025-05-27 ENCOUNTER — HOSPITAL ENCOUNTER (OUTPATIENT)
Facility: HOSPITAL | Age: 50
Setting detail: OUTPATIENT SURGERY
Discharge: HOME | End: 2025-05-27
Attending: SURGERY | Admitting: SURGERY
Payer: COMMERCIAL

## 2025-05-27 VITALS
RESPIRATION RATE: 18 BRPM | OXYGEN SATURATION: 96 % | DIASTOLIC BLOOD PRESSURE: 75 MMHG | HEART RATE: 71 BPM | SYSTOLIC BLOOD PRESSURE: 134 MMHG | TEMPERATURE: 99.5 F

## 2025-05-27 DIAGNOSIS — R20.0 NUMBNESS AND TINGLING IN RIGHT HAND: Primary | ICD-10-CM

## 2025-05-27 DIAGNOSIS — R20.2 NUMBNESS AND TINGLING IN RIGHT HAND: Primary | ICD-10-CM

## 2025-05-27 PROBLEM — G56.01 RIGHT CARPAL TUNNEL SYNDROME: Status: ACTIVE | Noted: 2025-05-27

## 2025-05-27 PROCEDURE — NC001 PR NO CHARGE: Performed by: SURGERY

## 2025-05-27 PROCEDURE — 64721 CARPAL TUNNEL SURGERY: CPT | Performed by: PHYSICIAN ASSISTANT

## 2025-05-27 PROCEDURE — 64721 CARPAL TUNNEL SURGERY: CPT | Performed by: SURGERY

## 2025-05-27 RX ORDER — MAGNESIUM HYDROXIDE 1200 MG/15ML
LIQUID ORAL AS NEEDED
Status: DISCONTINUED | OUTPATIENT
Start: 2025-05-27 | End: 2025-05-27 | Stop reason: HOSPADM

## 2025-05-27 RX ORDER — NAPROXEN 500 MG/1
500 TABLET ORAL 2 TIMES DAILY WITH MEALS
Qty: 10 TABLET | Refills: 0 | Status: SHIPPED | OUTPATIENT
Start: 2025-05-27 | End: 2025-06-01

## 2025-05-27 RX ORDER — ACETAMINOPHEN 325 MG/1
650 TABLET ORAL EVERY 6 HOURS PRN
Status: DISCONTINUED | OUTPATIENT
Start: 2025-05-27 | End: 2025-05-27 | Stop reason: HOSPADM

## 2025-05-27 RX ORDER — SENNOSIDES 8.6 MG
650 CAPSULE ORAL EVERY 8 HOURS PRN
Qty: 30 TABLET | Refills: 0 | Status: SHIPPED | OUTPATIENT
Start: 2025-05-27

## 2025-05-27 NOTE — H&P
Hand Surgery History and Physical    CC:  Right hand numbness    05/27/25  8:42 AM  [id number]  Nicolas Mari      HPI:  Pt is a 50 yo male with right hand numbness.  Ultrasounds with carpal tunnel and cubital tunnel syndromes.  He would like to address only the carpal tunnel syndrome due to insurance costs.  He would prefer this option as his symptoms are mainly in the median nerve distribution at the present time as well.     Past Medical History[1]    Past Surgical History[2]    Family History[3]    Current Outpatient Medications   Medication Instructions    acetaminophen (TYLENOL) 650 mg, Oral, Every 8 hours PRN    amLODIPine (NORVASC) 10 mg, Oral, Daily    busPIRone (BUSPAR) 10 mg, Oral, 3 times daily    escitalopram (LEXAPRO) 20 mg, Oral, Daily    hydroCHLOROthiazide 25 mg, Oral, Daily    lisinopril (ZESTRIL) 40 mg, Oral, Daily    metoprolol succinate (TOPROL-XL) 50 mg, Oral, Daily    naproxen (NAPROSYN) 500 mg, Oral, 2 times daily with meals    Zepbound 12.5 mg, Subcutaneous, Weekly        No Known Allergies     Review of Systems - General ROS: negative  Ophthalmic ROS: negative  ENT ROS: negative  Respiratory ROS: negative  Cardiovascular ROS: negative  Neurological ROS: negative  Dermatological ROS: negative    Vitals:    05/27/25 0832   BP: 148/98   Pulse:    Resp:    Temp:    SpO2:        Physical Examination:  General:  NAD  HEENT:  EOMI, perrla, normal ear morphology  Chest:  Unlabored breathing  Heart:  Regular pulse  Abd:  No distension  Extrem: RUE:  able to make a fist complex, no atrophy, 2 pt discrimination:  5 mm in all fingers  Skin:  Dry, pink  Neuro:  AAOx3         Assessment & Plan  Right carpal tunnel syndrome  Right carpal tunnel release today  Local  Consent obtained  2 week follow up                 [1]   Past Medical History:  Diagnosis Date    Anxiety 11/7/2023    Hyperkalemia 12/2/2020    Hypertension    [2] No past surgical history on file.  [3]   Family History  Problem Relation  Name Age of Onset    Hypertension Mother      Coronary artery disease Father      Heart disease Father      Lupus Father      Diabetes Father      Rheum arthritis Sister      Colon cancer Paternal Grandmother

## 2025-05-27 NOTE — OP NOTE
OPERATIVE REPORT  PATIENT NAME: Nicolas Mari    :  1975  MRN: [id number]  Pt Location:  OR ROOM 11    SURGERY DATE: 2025    Surgeons and Role:     * Doyle Draper MD - Primary     * John Henry PA-C - Assisting    Preop Diagnosis:  Carpal tunnel syndrome, bilateral [G56.03]    Post-Op Diagnosis Codes:     * Carpal tunnel syndrome, bilateral [G56.03]    Procedure(s):  Right - RELEASE CARPAL TUNNEL RIGHT    Specimen(s):  * No specimens in log *    Estimated Blood Loss:   Minimal    Drains:  * No LDAs found *    Anesthesia Type:   Local    Operative Indications:  Carpal tunnel syndrome, bilateral [G56.03]      Operative Findings:  Healthy appearing nerve after release       Complications:   None    Procedure and Technique:  The patient's right hand was cleansed with alcohol.  A field block was performed using marcaine 0.25% with epinephrine and lidocaine 1% with epinephrine in a 50-50 mixture.  The right upper extremity was prepped and draped in a sterile fashion.  A longitudinal incision was made overlying the transverse carpal ligament in line with the ring finger in a flexed position.  Sharp dissection was performed down to the level of the transverse carpal ligament.  A Weitlaner was used for counterretraction.  The transverse carpal ligament was divided with a 15 blade scalpel distally, and tenotomy scissors proximally along with a portion of the distal antebrachial fascia.  The wound was irrigated copiously with normal saline.  The skin was approximated with 4-0 nylon in an interrupted horizontal mattress fashion.  Xeroform was applied followed by gauze and an ace bandage over wrap.  The patient was transferred to recovery room in stable condition.      I was present for the entire procedure.    Patient Disposition:  PACU     My Assistant was necessary throughout the procedure(s) for retraction and positioning.    I understand that section 1842 (b)(7)(D) of the Social Security Act generally  prohibits Medicare physician fee schedule payment for the services of assistants-at-surgery in teaching hospitals when qualified residents are available to furnish such services. I certify that the services for which payment is claimed were medically necessary, and that no qualified resident was available to perform the services. I further understand that these services are subject to post-payment review by the Medicare carrier.       SIGNATURE: Doyle Draper MD  DATE: May 27, 2025  TIME: 9:24 AM

## 2025-06-10 ENCOUNTER — OFFICE VISIT (OUTPATIENT)
Age: 50
End: 2025-06-10

## 2025-06-10 VITALS — BODY MASS INDEX: 45.1 KG/M2 | HEIGHT: 70 IN | WEIGHT: 315 LBS

## 2025-06-10 DIAGNOSIS — Z98.890 S/P CARPAL TUNNEL RELEASE: ICD-10-CM

## 2025-06-10 DIAGNOSIS — G56.23 CUBITAL TUNNEL SYNDROME, BILATERAL: ICD-10-CM

## 2025-06-10 DIAGNOSIS — G56.03 CARPAL TUNNEL SYNDROME, BILATERAL: Primary | ICD-10-CM

## 2025-06-10 PROCEDURE — 99024 POSTOP FOLLOW-UP VISIT: CPT | Performed by: SURGERY

## 2025-06-10 NOTE — ASSESSMENT & PLAN NOTE
R CTR 5/27/2025.  Doing well with complete relief of symptoms.  Sutures removed.  Massage and moisturize incisional area to soften scar tissue.  Activities as tolerated, no restrictions.  May follow-up as needed.  Call the office with any questions or concerns in the future.

## 2025-06-10 NOTE — PROGRESS NOTES
Name: Nicolas Mari      : 1975      MRN: [id number]  Encounter Provider: Doyle Draper MD  Encounter Date: 6/10/2025   Encounter department: [facility]      Assessment & Plan  S/P carpal tunnel release  R CTR 2025.  Doing well with complete relief of symptoms.  Sutures removed.  Massage and moisturize incisional area to soften scar tissue.  Activities as tolerated, no restrictions.  May follow-up as needed.  Call the office with any questions or concerns in the future.       Carpal tunnel syndrome, bilateral  Patient's left-sided symptoms are currently mild.  He will hold off on scheduling surgery at this time.  If his symptoms worsen, he will call us and let us know and we can book the surgery over the phone.  He would not need another office visit for this in the future.       Cubital tunnel syndrome, bilateral  Observation for now.  We did discuss ulnar nerve symptoms and we mutually decided not to pursue surgery for this given these were minimal.              Chief Complaint   Patient presents with    Right Hand - Post-op     CTR       History of Present Illness   Patient is a 49 y.o. male here for post-op visit.  States he got basically complete relief of symptoms shortly after the surgery.  Still has some mild intermittent numbness and tingling in the ring and small fingers but does not bother him much.  No issues with the incision.  No fever or chills.  No acute complaints offered today.      Review of Systems A 10 point ROS was performed; negative except as noted above.   Past Surgical History[1]   Past Medical History[2]   Allergies[3]   Objective   Current Outpatient Medications   Medication Instructions    acetaminophen (TYLENOL) 650 mg, Oral, Every 8 hours PRN    amLODIPine (NORVASC) 10 mg, Oral, Daily    busPIRone (BUSPAR) 10 mg, Oral, 3 times daily    escitalopram (LEXAPRO) 20 mg, Oral, Daily    hydroCHLOROthiazide 25 mg, Oral, Daily    lisinopril  (ZESTRIL) 40 mg, Oral, Daily    metoprolol succinate (TOPROL-XL) 50 mg, Oral, Daily    naproxen (NAPROSYN) 500 mg, Oral, 2 times daily with meals    Zepbound 12.5 mg, Subcutaneous, Weekly        Imaging  No new imaging today     Physical Exam    General appearance:  NAD   Cardiac:  Regular rate  Lungs:  Unlabored breathing  Abdomen:  Non-distended    Orthopedic Exam:    Right hand     Inspection: Incision clean dry intact with some peeling scab skin.  No erythema drainage or wound dehiscence.    Palpation: Scar tissue incisional area is minimally tender to palpation.    Range-of-motion:  Normal wrist ROM, full composite fist.    Strength: Normal    Sensation:  ILT m/r/u nerve distributions.    Special Tests:  Palpable radial pulse  UE warm and well perfused.    Suture removal    Date/Time: 6/10/2025 3:45 PM    Performed by: John Henry PA-C  Authorized by: Doyle Draper MD    Universal Protocol:  Consent: Verbal consent obtained  Risks and benefits: risks, benefits and alternatives were discussed  Consent given by: patient  Patient identity confirmed: verbally with patient      Patient location:  Clinic  Location:     Laterality:  Right    Location:  Upper extremity    Upper extremity location:  Hand    Hand location:  R hand  Procedure details:     Tools used:  Suture removal kit    Wound appearance:  No sign(s) of infection, good wound healing and clean  Post-procedure details:     Post-removal:  No dressing applied    Patient tolerance of procedure:  Tolerated well, no immediate complications        John Henry PA-C          [1]   Past Surgical History:  Procedure Laterality Date    MA NEUROPLASTY &/TRANSPOS MEDIAN NRV CARPAL TUNNE Right 5/27/2025    Procedure: RELEASE CARPAL TUNNEL RIGHT;  Surgeon: Doyle Draper MD;  Location:  MAIN OR;  Service: Orthopedics   [2]   Past Medical History:  Diagnosis Date    Anxiety 11/7/2023    Hyperkalemia 12/2/2020    Hypertension    [3] No Known Allergies

## 2025-06-13 DIAGNOSIS — F41.9 ANXIETY: ICD-10-CM

## 2025-06-13 RX ORDER — BUSPIRONE HYDROCHLORIDE 10 MG/1
10 TABLET ORAL 3 TIMES DAILY
Qty: 270 TABLET | Refills: 1 | Status: SHIPPED | OUTPATIENT
Start: 2025-06-13

## 2025-06-14 LAB
CHOLEST SERPL-MCNC: 168 MG/DL (ref 100–199)
CHOLEST/HDLC SERPL: 5.4 RATIO (ref 0–5)
EST. AVERAGE GLUCOSE BLD GHB EST-MCNC: 100 MG/DL
HBA1C MFR BLD: 5.1 % (ref 4.8–5.6)
HDLC SERPL-MCNC: 31 MG/DL
LDL DIRECT COMMENT: NORMAL
LDLC SERPL CALC-MCNC: 80 MG/DL (ref 0–99)
LDLC SERPL DIRECT ASSAY-MCNC: 85 MG/DL (ref 0–99)
SL AMB VLDL CHOLESTEROL CALC: 57 MG/DL (ref 5–40)
TRIGL SERPL-MCNC: 350 MG/DL (ref 0–149)

## 2025-06-16 ENCOUNTER — RESULTS FOLLOW-UP (OUTPATIENT)
Dept: FAMILY MEDICINE CLINIC | Facility: HOSPITAL | Age: 50
End: 2025-06-16

## 2025-06-16 DIAGNOSIS — E66.01 MORBID OBESITY WITH BMI OF 45.0-49.9, ADULT (HCC): Primary | ICD-10-CM

## 2025-06-16 RX ORDER — TIRZEPATIDE 15 MG/.5ML
15 INJECTION, SOLUTION SUBCUTANEOUS WEEKLY
Qty: 6 ML | Refills: 1 | Status: SHIPPED | OUTPATIENT
Start: 2025-06-16

## 2025-08-01 ENCOUNTER — APPOINTMENT (OUTPATIENT)
Age: 50
End: 2025-08-01
Attending: ORTHOPAEDIC SURGERY
Payer: COMMERCIAL

## 2025-08-01 ENCOUNTER — OFFICE VISIT (OUTPATIENT)
Age: 50
End: 2025-08-01
Payer: COMMERCIAL

## 2025-08-01 DIAGNOSIS — M25.512 ACUTE PAIN OF LEFT SHOULDER: ICD-10-CM

## 2025-08-01 DIAGNOSIS — M25.512 ACUTE PAIN OF LEFT SHOULDER: Primary | ICD-10-CM

## 2025-08-01 PROCEDURE — 73030 X-RAY EXAM OF SHOULDER: CPT

## 2025-08-01 PROCEDURE — 99204 OFFICE O/P NEW MOD 45 MIN: CPT | Performed by: ORTHOPAEDIC SURGERY

## 2025-08-11 ENCOUNTER — OFFICE VISIT (OUTPATIENT)
Dept: FAMILY MEDICINE CLINIC | Facility: HOSPITAL | Age: 50
End: 2025-08-11
Payer: COMMERCIAL

## 2025-08-12 ENCOUNTER — OFFICE VISIT (OUTPATIENT)
Dept: FAMILY MEDICINE CLINIC | Facility: HOSPITAL | Age: 50
End: 2025-08-12
Payer: COMMERCIAL

## 2025-08-12 ENCOUNTER — NURSE TRIAGE (OUTPATIENT)
Age: 50
End: 2025-08-12

## 2025-08-15 DIAGNOSIS — I10 UNCONTROLLED HYPERTENSION: ICD-10-CM

## 2025-08-15 DIAGNOSIS — I10 ESSENTIAL HYPERTENSION: ICD-10-CM

## 2025-08-15 DIAGNOSIS — F41.9 ANXIETY: ICD-10-CM

## 2025-08-17 ENCOUNTER — HOSPITAL ENCOUNTER (OUTPATIENT)
Age: 50
Discharge: HOME | End: 2025-08-17
Payer: COMMERCIAL

## 2025-08-17 DIAGNOSIS — M25.512 ACUTE PAIN OF LEFT SHOULDER: ICD-10-CM

## 2025-08-17 PROCEDURE — 73221 MRI JOINT UPR EXTREM W/O DYE: CPT

## 2025-08-17 RX ORDER — HYDROCHLOROTHIAZIDE 25 MG/1
25 TABLET ORAL DAILY
Qty: 90 TABLET | Refills: 3 | Status: SHIPPED | OUTPATIENT
Start: 2025-08-17

## 2025-08-17 RX ORDER — AMLODIPINE BESYLATE 10 MG/1
10 TABLET ORAL DAILY
Qty: 90 TABLET | Refills: 3 | Status: SHIPPED | OUTPATIENT
Start: 2025-08-17

## 2025-08-17 RX ORDER — ESCITALOPRAM OXALATE 20 MG/1
20 TABLET ORAL DAILY
Qty: 90 TABLET | Refills: 3 | Status: SHIPPED | OUTPATIENT
Start: 2025-08-17

## 2025-08-17 RX ORDER — LISINOPRIL 40 MG/1
40 TABLET ORAL DAILY
Qty: 90 TABLET | Refills: 3 | Status: SHIPPED | OUTPATIENT
Start: 2025-08-17

## 2025-08-17 RX ORDER — METOPROLOL SUCCINATE 50 MG/1
50 TABLET, EXTENDED RELEASE ORAL DAILY
Qty: 90 TABLET | Refills: 3 | Status: SHIPPED | OUTPATIENT
Start: 2025-08-17

## 2025-08-25 PROBLEM — S46.012A TRAUMATIC COMPLETE TEAR OF LEFT ROTATOR CUFF, INITIAL ENCOUNTER: Status: ACTIVE | Noted: 2025-08-25

## (undated) DEVICE — INTENDED FOR TISSUE SEPARATION, AND OTHER PROCEDURES THAT REQUIRE A SHARP SURGICAL BLADE TO PUNCTURE OR CUT.: Brand: BARD-PARKER ® SAFETYLOCK CARBON RIB-BACK BLADES

## (undated) DEVICE — STERILE BETHLEHEM PLASTIC HAND: Brand: CARDINAL HEALTH

## (undated) DEVICE — NEEDLE 23G X 1 1/2 SAFETY-GLIDE THIN WALL

## (undated) DEVICE — STERILE POLYISOPRENE POWDER-FREE SURGICAL GLOVES WITH EMOLLIENT COATING: Brand: PROTEXIS

## (undated) DEVICE — STERILE POLYISOPRENE POWDER-FREE SURGICAL GLOVES: Brand: PROTEXIS

## (undated) DEVICE — SUT ETHILON 4-0 PS-2 18 IN 1667H

## (undated) DEVICE — DISPOSABLE OR TOWEL: Brand: CARDINAL HEALTH

## (undated) DEVICE — USED IN CONJUNCTION WITH A SYRINGE AS AN ADDITIVE DEVICE FOR ASPIRATION FROM MULTI-DOSE MEDICINE VIALS OR INJECTION INTO I.V. SYSTEMS AND PRE-SLIT SEPTUMS COVERING INJECTION SITES.: Brand: SOL-M™ BLUNT FILL NEEDLE

## (undated) DEVICE — SYRINGE 10ML LL